# Patient Record
Sex: FEMALE | Race: WHITE | Employment: FULL TIME | URBAN - METROPOLITAN AREA
[De-identification: names, ages, dates, MRNs, and addresses within clinical notes are randomized per-mention and may not be internally consistent; named-entity substitution may affect disease eponyms.]

---

## 2022-07-05 ENCOUNTER — APPOINTMENT (OUTPATIENT)
Dept: CT IMAGING | Age: 62
End: 2022-07-05
Attending: STUDENT IN AN ORGANIZED HEALTH CARE EDUCATION/TRAINING PROGRAM
Payer: COMMERCIAL

## 2022-07-05 ENCOUNTER — HOSPITAL ENCOUNTER (OUTPATIENT)
Age: 62
Setting detail: OBSERVATION
Discharge: HOME | End: 2022-07-06
Attending: STUDENT IN AN ORGANIZED HEALTH CARE EDUCATION/TRAINING PROGRAM | Admitting: INTERNAL MEDICINE
Payer: COMMERCIAL

## 2022-07-05 ENCOUNTER — APPOINTMENT (OUTPATIENT)
Dept: MRI IMAGING | Age: 62
End: 2022-07-05
Attending: INTERNAL MEDICINE
Payer: COMMERCIAL

## 2022-07-05 DIAGNOSIS — I63.50 POSTERIOR CIRCULATION STROKE (HCC): ICD-10-CM

## 2022-07-05 DIAGNOSIS — G45.9 TIA (TRANSIENT ISCHEMIC ATTACK): Primary | ICD-10-CM

## 2022-07-05 LAB
ALBUMIN SERPL-MCNC: 3.6 G/DL (ref 3.5–5)
ALBUMIN/GLOB SERPL: 1.1 {RATIO} (ref 1.1–2.2)
ALP SERPL-CCNC: 95 U/L (ref 45–117)
ALT SERPL-CCNC: 27 U/L (ref 12–78)
ANION GAP SERPL CALC-SCNC: 6 MMOL/L (ref 5–15)
AST SERPL-CCNC: 14 U/L (ref 15–37)
BASOPHILS # BLD: 0 K/UL (ref 0–0.1)
BASOPHILS NFR BLD: 1 % (ref 0–1)
BILIRUB SERPL-MCNC: 0.4 MG/DL (ref 0.2–1)
BUN SERPL-MCNC: 10 MG/DL (ref 6–20)
BUN/CREAT SERPL: 14 (ref 12–20)
CALCIUM SERPL-MCNC: 9.1 MG/DL (ref 8.5–10.1)
CHLORIDE SERPL-SCNC: 108 MMOL/L (ref 97–108)
CHOLEST SERPL-MCNC: 133 MG/DL
CO2 SERPL-SCNC: 28 MMOL/L (ref 21–32)
CREAT SERPL-MCNC: 0.71 MG/DL (ref 0.55–1.02)
DIFFERENTIAL METHOD BLD: NORMAL
EOSINOPHIL # BLD: 0.2 K/UL (ref 0–0.4)
EOSINOPHIL NFR BLD: 3 % (ref 0–7)
ERYTHROCYTE [DISTWIDTH] IN BLOOD BY AUTOMATED COUNT: 13.7 % (ref 11.5–14.5)
GLOBULIN SER CALC-MCNC: 3.4 G/DL (ref 2–4)
GLUCOSE BLD STRIP.AUTO-MCNC: 97 MG/DL (ref 65–117)
GLUCOSE SERPL-MCNC: 102 MG/DL (ref 65–100)
HCT VFR BLD AUTO: 39.6 % (ref 35–47)
HDLC SERPL-MCNC: 63 MG/DL
HDLC SERPL: 2.1 {RATIO} (ref 0–5)
HGB BLD-MCNC: 12.5 G/DL (ref 11.5–16)
IMM GRANULOCYTES # BLD AUTO: 0 K/UL (ref 0–0.04)
IMM GRANULOCYTES NFR BLD AUTO: 0 % (ref 0–0.5)
INR PPP: 0.9 (ref 0.9–1.1)
LDLC SERPL CALC-MCNC: 47.8 MG/DL (ref 0–100)
LYMPHOCYTES # BLD: 2 K/UL (ref 0.8–3.5)
LYMPHOCYTES NFR BLD: 27 % (ref 12–49)
MCH RBC QN AUTO: 28.4 PG (ref 26–34)
MCHC RBC AUTO-ENTMCNC: 31.6 G/DL (ref 30–36.5)
MCV RBC AUTO: 90 FL (ref 80–99)
MONOCYTES # BLD: 0.6 K/UL (ref 0–1)
MONOCYTES NFR BLD: 8 % (ref 5–13)
NEUTS SEG # BLD: 4.5 K/UL (ref 1.8–8)
NEUTS SEG NFR BLD: 61 % (ref 32–75)
NRBC # BLD: 0 K/UL (ref 0–0.01)
NRBC BLD-RTO: 0 PER 100 WBC
PLATELET # BLD AUTO: 280 K/UL (ref 150–400)
PMV BLD AUTO: 9.7 FL (ref 8.9–12.9)
POTASSIUM SERPL-SCNC: 3.7 MMOL/L (ref 3.5–5.1)
PROT SERPL-MCNC: 7 G/DL (ref 6.4–8.2)
PROTHROMBIN TIME: 9.8 SEC (ref 9–11.1)
RBC # BLD AUTO: 4.4 M/UL (ref 3.8–5.2)
SERVICE CMNT-IMP: NORMAL
SODIUM SERPL-SCNC: 142 MMOL/L (ref 136–145)
TRIGL SERPL-MCNC: 111 MG/DL (ref ?–150)
TSH SERPL DL<=0.05 MIU/L-ACNC: 1.4 UIU/ML (ref 0.36–3.74)
VLDLC SERPL CALC-MCNC: 22.2 MG/DL
WBC # BLD AUTO: 7.4 K/UL (ref 3.6–11)

## 2022-07-05 PROCEDURE — 94762 N-INVAS EAR/PLS OXIMTRY CONT: CPT

## 2022-07-05 PROCEDURE — 80053 COMPREHEN METABOLIC PANEL: CPT

## 2022-07-05 PROCEDURE — 85025 COMPLETE CBC W/AUTO DIFF WBC: CPT

## 2022-07-05 PROCEDURE — 74011250637 HC RX REV CODE- 250/637: Performed by: FAMILY MEDICINE

## 2022-07-05 PROCEDURE — 84443 ASSAY THYROID STIM HORMONE: CPT

## 2022-07-05 PROCEDURE — 93005 ELECTROCARDIOGRAM TRACING: CPT

## 2022-07-05 PROCEDURE — G0378 HOSPITAL OBSERVATION PER HR: HCPCS

## 2022-07-05 PROCEDURE — 80061 LIPID PANEL: CPT

## 2022-07-05 PROCEDURE — 36415 COLL VENOUS BLD VENIPUNCTURE: CPT

## 2022-07-05 PROCEDURE — 85610 PROTHROMBIN TIME: CPT

## 2022-07-05 PROCEDURE — 97530 THERAPEUTIC ACTIVITIES: CPT

## 2022-07-05 PROCEDURE — 74011000636 HC RX REV CODE- 636: Performed by: STUDENT IN AN ORGANIZED HEALTH CARE EDUCATION/TRAINING PROGRAM

## 2022-07-05 PROCEDURE — 74011250636 HC RX REV CODE- 250/636: Performed by: STUDENT IN AN ORGANIZED HEALTH CARE EDUCATION/TRAINING PROGRAM

## 2022-07-05 PROCEDURE — 82962 GLUCOSE BLOOD TEST: CPT

## 2022-07-05 PROCEDURE — 95816 EEG AWAKE AND DROWSY: CPT | Performed by: PSYCHIATRY & NEUROLOGY

## 2022-07-05 PROCEDURE — 70551 MRI BRAIN STEM W/O DYE: CPT

## 2022-07-05 PROCEDURE — 70496 CT ANGIOGRAPHY HEAD: CPT

## 2022-07-05 PROCEDURE — 65270000046 HC RM TELEMETRY

## 2022-07-05 PROCEDURE — 97165 OT EVAL LOW COMPLEX 30 MIN: CPT

## 2022-07-05 PROCEDURE — 74011250637 HC RX REV CODE- 250/637: Performed by: STUDENT IN AN ORGANIZED HEALTH CARE EDUCATION/TRAINING PROGRAM

## 2022-07-05 PROCEDURE — 99285 EMERGENCY DEPT VISIT HI MDM: CPT

## 2022-07-05 PROCEDURE — 92610 EVALUATE SWALLOWING FUNCTION: CPT

## 2022-07-05 PROCEDURE — 83036 HEMOGLOBIN GLYCOSYLATED A1C: CPT

## 2022-07-05 PROCEDURE — 70450 CT HEAD/BRAIN W/O DYE: CPT

## 2022-07-05 RX ORDER — GUAIFENESIN 100 MG/5ML
81 LIQUID (ML) ORAL DAILY
Status: DISCONTINUED | OUTPATIENT
Start: 2022-07-05 | End: 2022-07-06 | Stop reason: HOSPADM

## 2022-07-05 RX ORDER — ACETAMINOPHEN 650 MG/1
650 SUPPOSITORY RECTAL
Status: DISCONTINUED | OUTPATIENT
Start: 2022-07-05 | End: 2022-07-06 | Stop reason: HOSPADM

## 2022-07-05 RX ORDER — BUTALBITAL, ACETAMINOPHEN AND CAFFEINE 50; 325; 40 MG/1; MG/1; MG/1
1 TABLET ORAL ONCE
Status: COMPLETED | OUTPATIENT
Start: 2022-07-05 | End: 2022-07-05

## 2022-07-05 RX ORDER — GUAIFENESIN 100 MG/5ML
81 LIQUID (ML) ORAL DAILY
Status: DISCONTINUED | OUTPATIENT
Start: 2022-07-05 | End: 2022-07-05

## 2022-07-05 RX ORDER — MECLIZINE HYDROCHLORIDE 25 MG/1
25 TABLET ORAL
Status: COMPLETED | OUTPATIENT
Start: 2022-07-05 | End: 2022-07-05

## 2022-07-05 RX ORDER — CLOPIDOGREL BISULFATE 75 MG/1
75 TABLET ORAL DAILY
Status: DISCONTINUED | OUTPATIENT
Start: 2022-07-05 | End: 2022-07-05

## 2022-07-05 RX ORDER — ACETAMINOPHEN 325 MG/1
650 TABLET ORAL
Status: DISCONTINUED | OUTPATIENT
Start: 2022-07-05 | End: 2022-07-06 | Stop reason: HOSPADM

## 2022-07-05 RX ORDER — CLOPIDOGREL BISULFATE 75 MG/1
75 TABLET ORAL DAILY
Status: DISCONTINUED | OUTPATIENT
Start: 2022-07-06 | End: 2022-07-06

## 2022-07-05 RX ORDER — CLOPIDOGREL BISULFATE 75 MG/1
300 TABLET ORAL
Status: COMPLETED | OUTPATIENT
Start: 2022-07-05 | End: 2022-07-05

## 2022-07-05 RX ADMIN — CLOPIDOGREL BISULFATE 300 MG: 75 TABLET ORAL at 10:20

## 2022-07-05 RX ADMIN — MECLIZINE HYDROCHLORIDE 25 MG: 25 TABLET ORAL at 10:21

## 2022-07-05 RX ADMIN — IOPAMIDOL 100 ML: 755 INJECTION, SOLUTION INTRAVENOUS at 09:32

## 2022-07-05 RX ADMIN — ASPIRIN 81 MG: 81 TABLET, CHEWABLE ORAL at 10:21

## 2022-07-05 RX ADMIN — BUTALBITAL, ACETAMINOPHEN, AND CAFFEINE 1 TABLET: 50; 325; 40 TABLET ORAL at 15:37

## 2022-07-05 NOTE — PROCEDURES
[facility] 79  EEG    Name:  Sherry Casarez  MR#:  [id number]  :  1960  ACCOUNT #:  [de-identified]  DATE OF SERVICE:  2022      REFERRING PROVIDER:  German Weller MD    CLINICAL HISTORY:  An EEG is requested on this 51-year-old lady to evaluate for epileptiform abnormality. MEDICATIONS:  Vitamins. EEG REPORT:  This tracing is obtained during the awake state. During wakefulness, there are intermittent runs of posteriorly dominant and symmetrical low-to-medium amplitude 11 cycle per second activities which attenuate with eye opening. Lower voltage faster frequency activities are seen symmetrically over the anterior head regions. Hyperventilation is not performed. Intermittent photic stimulation induces symmetric posterior driving responses. Sleep is not attained. IMPRESSION/INTERPRETATION:  This EEG recorded during the awake state is normal.  No epileptiform abnormalities are seen.       Karen Larios MD      [id number]  D:  2022 16:30  T:  2022 17:58  JOB #:  [id number]

## 2022-07-05 NOTE — PROGRESS NOTES
SPEECH PATHOLOGY BEDSIDE SWALLOW EVALUATION/DISCHARGE  Patient: Debora Caruso (79 y.o. female)  Date: 7/5/2022  Primary Diagnosis: TIA (transient ischemic attack) [G45.9]       Precautions: aspiration       ASSESSMENT :  Based on the objective data described below, the patient presents with functional swallow and speech. Still c/o upper L facial numbness and L eye opening slightly slower after blink than R eye. Admitted 7-5-22 with vertigo, nausea, L facial numbness, L UE weakness. MRI: negative. PMH: CAD, trauma. Chart lists CVA,but she denies. .  Skilled acute therapy provided by a speech-language pathologist is not indicated at this time. PLAN :  Recommendations:  Ok for regular diet, thin liquids. Discharge Recommendations: None     SUBJECTIVE:   Patient stated I woke up and I realized I was walking like a drunk . OBJECTIVE:     Past Medical History:   Diagnosis Date    Occipital headache 7/25/2016     Past Surgical History:   Procedure Laterality Date    HX CHOLECYSTECTOMY      HX HYSTERECTOMY  2017     Prior Level of Function/Home Situation:      Diet prior to admission: regular, thins  Current Diet:  Regular, thins. She passed  A Monique SS   Cognitive and Communication Status:  Neurologic State: Alert  Orientation Level: Oriented X4  Cognition: Follows commands  Perception: Appears intact     Safety/Judgement: Lack of insight into deficits  Oral Assessment:  Oral Assessment  Labial:  (c/o L facial numbness \"more around the eye\". SLP noted slower eye opening after blink on L than R)  Dentition: Natural  Lingual: No impairment  Velum: No impairment  Mandible: No impairment  P.O. Trials:  Patient Position: upright in bed  Vocal quality prior to P.O.: No impairment  Consistency Presented: Solid; Thin liquid  How Presented: Self-fed/presented;Straw;Successive swallows   ORAL PHASE:   Bolus Acceptance: No impairment  Bolus Formation/Control: No impairment     Propulsion: No impairment  Oral Residue: None   PHARYNGEAL PHASE:   Initiation of Swallow: No impairment  Laryngeal Elevation: Functional  Aspiration Signs/Symptoms: None  Pharyngeal Phase Characteristics: No impairment, issues, or problems       SPEECH;   WNL             NOMS:   The NOMS functional outcome measure was used to quantify this patient's level of swallowing impairment. Based on the NOMS, the patient was determined to be at level 7 for swallow function     NOMS Swallowing Levels:  Level 1 (CN): NPO  Level 2 (CM): NPO but takes consistency in therapy  Level 3 (CL): Takes less than 50% of nutrition p.o. and continues with nonoral feedings; and/or safe with mod cues; and/or max diet restriction  Level 4 (CK): Safe swallow but needs mod cues; and/or mod diet restriction; and/or still requires some nonoral feeding/supplements  Level 5 (CJ): Safe swallow with min diet restriction; and/or needs min cues  Level 6 (CI): Independent with p.o.; rare cues; usually self cues; may need to avoid some foods or needs extra time  Level 7 ([address] [place] [address]): Independent for all p.o.  SHARON. (2003). National Outcomes Measurement System (NOMS): Adult Speech-Language Pathology User's Guide. Pain:  Pain Scale 1: Numeric (0 - 10)  Pain Intensity 1: 0     After treatment:   Patient left in no apparent distress in bed, Call bell within reach and Nursing notified    COMMUNICATION/EDUCATION:   Patient was educated regarding her deficit(s) of NONE as this relates to her diagnosis of TIA. She demonstrated Excellent understanding as evidenced by Discussion. .    The patient's plan of care including recommendations, planned interventions, and recommended diet changes were discussed with: Registered nurse.  OT    Thank you for this referral.  YELENA Allen  Time Calculation: 10 mins

## 2022-07-05 NOTE — H&P
[facility]  [address] [place] [address], [id number] [facility]  [phone]    [address] Adult  Hospitalist Group    History & Physical    Date of service: 7/5/2022    Patient name: Tran Way  MRN: [id number]  YOB: 1960  Age: 58 y.o. Primary care provider:  Bradley Phillips MD     Source of Information: patient, medical record    Chief complain: left face and arm numbness    History of present illness  Tran Way is a 58 y.o. female who presented with headache and left and arm numbness that she noticed upon waking this morning. She reports some nausea, blurry vision and unsteadiness with ambulation as well. She also reports feeling dizzy, but she as able to  herself to the ED, and while driving she noticed that her left arm was feeling a little weaker than usual. Symptoms have now mostly resolved, but she still reports having headache. She has been told her blood pressure is intermittently elevated, but is not taking any medications at home. Past Medical History:   Diagnosis Date    Occipital headache 7/25/2016      Past Surgical History:   Procedure Laterality Date    HX CHOLECYSTECTOMY      HX HYSTERECTOMY  2017     Prior to Admission medications    Multivitamin  Vitamin D     No Known Allergies     Family history  -heart disease  -no h/o stroke  -HTN     Social history    Social History     Tobacco Use   Smoking Status Former Smoker   Smokeless Tobacco Never Used       Social History     Substance and Sexual Activity   Alcohol Use Yes    Comment: occasional glass of wine       Code status  Code status discussed with the patient/caregivers. Full Code    Review of systems    A comprehensive review of systems was negative except for that written in the History of Present Illness.        Physical Examination   Visit Vitals  BP (!) 140/88 (BP 1 Location: Left lower arm, BP Patient Position: At rest)   Pulse 64   Temp 97.9 °F (36.6 °C)   Resp 16 Ht 5' 5.5\" (1.664 m)   Wt 115.2 kg (253 lb 15.5 oz)   LMP 03/17/2014   SpO2 99%   BMI 41.62 kg/m²          O2 Device: None (Room air)    Gen:  awake, alert, NAD   HEENT:  Pink conjunctivae, PERRL, hearing intact to voice, moist mucous membranes  Neck:  Supple, without masses, thyroid non-tender  Resp:  No accessory muscle use, clear breath sounds without wheezes rales or rhonchi  Card:  No murmurs, normal S1, S2 without thrills, bruits or peripheral edema  Abd:  Soft, non-tender, non-distended, normoactive bowel sounds are present  Lymph:  No cervical adenopathy  Musc:  No cyanosis or clubbing  Skin:  No rashes   Neuro:  Cranial nerves 3-12 are grossly intact, follows commands appropriately  Psych:  Alert with good insight. Oriented to person, place, and time    Data Review    24 Hour Results:  Recent Results (from the past 24 hour(s))   GLUCOSE, POC    Collection Time: 07/05/22  9:16 AM   Result Value Ref Range    Glucose (POC) 97 65 - 117 mg/dL    Performed by Roosevelt Vaughn    CBC WITH AUTOMATED DIFF    Collection Time: 07/05/22  9:28 AM   Result Value Ref Range    WBC 7.4 3.6 - 11.0 K/uL    RBC 4.40 3.80 - 5.20 M/uL    HGB 12.5 11.5 - 16.0 g/dL    HCT 39.6 35.0 - 47.0 %    MCV 90.0 80.0 - 99.0 FL    MCH 28.4 26.0 - 34.0 PG    MCHC 31.6 30.0 - 36.5 g/dL    RDW 13.7 11.5 - 14.5 %    PLATELET 636 800 - 125 K/uL    MPV 9.7 8.9 - 12.9 FL    NRBC 0.0 0.0  WBC    ABSOLUTE NRBC 0.00 0.00 - 0.01 K/uL    NEUTROPHILS 61 32 - 75 %    LYMPHOCYTES 27 12 - 49 %    MONOCYTES 8 5 - 13 %    EOSINOPHILS 3 0 - 7 %    BASOPHILS 1 0 - 1 %    IMMATURE GRANULOCYTES 0 0 - 0.5 %    ABS. NEUTROPHILS 4.5 1.8 - 8.0 K/UL    ABS. LYMPHOCYTES 2.0 0.8 - 3.5 K/UL    ABS. MONOCYTES 0.6 0.0 - 1.0 K/UL    ABS. EOSINOPHILS 0.2 0.0 - 0.4 K/UL    ABS. BASOPHILS 0.0 0.0 - 0.1 K/UL    ABS. IMM.  GRANS. 0.0 0.00 - 0.04 K/UL    DF AUTOMATED     METABOLIC PANEL, COMPREHENSIVE    Collection Time: 07/05/22  9:28 AM   Result Value Ref Range Sodium 142 136 - 145 mmol/L    Potassium 3.7 3.5 - 5.1 mmol/L    Chloride 108 97 - 108 mmol/L    CO2 28 21 - 32 mmol/L    Anion gap 6 5 - 15 mmol/L    Glucose 102 (H) 65 - 100 mg/dL    BUN 10 6 - 20 MG/DL    Creatinine 0.71 0.55 - 1.02 MG/DL    BUN/Creatinine ratio 14 12 - 20      GFR est AA >60 >60 ml/min/1.73m2    GFR est non-AA >60 >60 ml/min/1.73m2    Calcium 9.1 8.5 - 10.1 MG/DL    Bilirubin, total 0.4 0.2 - 1.0 MG/DL    ALT (SGPT) 27 12 - 78 U/L    AST (SGOT) 14 (L) 15 - 37 U/L    Alk. phosphatase 95 45 - 117 U/L    Protein, total 7.0 6.4 - 8.2 g/dL    Albumin 3.6 3.5 - 5.0 g/dL    Globulin 3.4 2.0 - 4.0 g/dL    A-G Ratio 1.1 1.1 - 2.2     PROTHROMBIN TIME + INR    Collection Time: 07/05/22  9:28 AM   Result Value Ref Range    INR 0.9 0.9 - 1.1      Prothrombin time 9.8 9.0 - 11.1 sec   TSH 3RD GENERATION    Collection Time: 07/05/22  9:28 AM   Result Value Ref Range    TSH 1.40 0.36 - 3.74 uIU/mL   EKG, 12 LEAD, INITIAL    Collection Time: 07/05/22  9:41 AM   Result Value Ref Range    Ventricular Rate 69 BPM    Atrial Rate 69 BPM    P-R Interval 146 ms    QRS Duration 90 ms    Q-T Interval 400 ms    QTC Calculation (Bezet) 428 ms    Calculated P Axis 36 degrees    Calculated R Axis 52 degrees    Calculated T Axis 36 degrees    Diagnosis       Normal sinus rhythm  Normal ECG  When compared with ECG of 25-JUL-2016 01:16,  No significant change was found       Recent Labs     07/05/22 0928   WBC 7.4   HGB 12.5   HCT 39.6        Recent Labs     07/05/22 0928      K 3.7      CO2 28   *   BUN 10   CREA 0.71   CA 9.1   ALB 3.6   TBILI 0.4   ALT 27   INR 0.9       Imaging  MRI BRAIN WO CONT    Result Date: 7/5/2022  EXAM: MRI BRAIN WO CONT INDICATION: TIA dizziness COMPARISON: July 2016 CONTRAST: None. TECHNIQUE:  Multiplanar multisequence acquisition without contrast of the brain. FINDINGS: Diffusion imaging does not show acute ischemic changes.  There is no extra-axial fluid collection hemorrhage or shift. There is no significant white matter disease with normal size ventricles. Flow voids in major vessels at the base of the brain are present. No mass. Negative examination. CTA CODE NEURO HEAD AND NECK W CONT    Result Date: 7/5/2022  EXAM:  CTA CODE NEURO HEAD AND NECK W CONT INDICATION:   Code Stroke COMPARISON:  CT head 7/5/2022, MRA head 7/25/2016. CONTRAST:  100 mL of Isovue-370. TECHNIQUE:  Unenhanced  images were obtained to localize the volume for acquisition. Multislice helical axial CT angiography was performed from the aortic arch to the top of the head during uneventful rapid bolus intravenous contrast administration. Coronal and sagittal reformations and 3D post processing was performed. CT dose reduction was achieved through use of a standardized protocol tailored for this examination and automatic exposure control for dose modulation. This study was analyzed by the [address]  algorithm. FINDINGS: CTA Head: There is no evidence of large vessel occlusion or flow-limiting stenosis of the intracranial internal carotid, anterior cerebral, and middle cerebral arteries. The anterior communicating artery is patent. There is no evidence of large vessel occlusion or flow-limiting stenosis of the intracranial vertebral arteries, basilar artery, or posterior cerebral arteries. The posterior communicating arteries are patent. There is no evidence of aneurysm or vascular malformation. The dural venous sinuses and deep cerebral venous system are patent. No evidence of abnormal enhancement on delayed phase images. CTA NECK: NASCET method was utilized for calculating stenosis. The aortic arch is unremarkable. The common carotid arteries demonstrate no significant stenosis. There is no evidence of significant stenosis in the cervical right internal carotid artery. There is no evidence of significant stenosis in the cervical left internal carotid artery.  There is a right dominant vertebrobasilar arterial system. The left vertebral artery arises directly from the aortic arch. The cervical vertebral arteries are normal in course, size and contour without significant stenosis. There is a 2.3 cm thyroid isthmus nodule (series 3 image 42). Visualized lung apices are clear. No acute fracture or aggressive osseous lesion. Reversal of the cervical lordosis with multilevel degenerative disc disease most advanced at C5-C6. CTA Head: 1. No evidence of significant stenosis or aneurysm. CTA Neck: 1. No evidence of significant stenosis. CT CODE NEURO HEAD WO CONTRAST    Result Date: 7/5/2022  EXAM: CT CODE NEURO HEAD WO CONTRAST INDICATION: Code Stroke COMPARISON: MRI brain 7/25/2016, CT head 7/25/2016. TECHNIQUE: Unenhanced CT of the head was performed using 5 mm images. Brain and bone windows were generated. CT dose reduction was achieved through use of a standardized protocol tailored for this examination and automatic exposure control for dose modulation. FINDINGS: Generalized volume loss. There is no significant white matter disease. There is no intracranial hemorrhage, extra-axial collection, mass, mass effect or midline shift. The basilar cisterns are open. No acute infarct is identified. The bone windows demonstrate no abnormalities. The visualized portions of the paranasal sinuses and mastoid air cells are clear. No acute abnormality        Assessment and Plan     TIA  -presented with left facial and LUE deficits  -MRI neg for acute stroke and CTA neck neg for significant stenosis  -check lipds and A1C  -PT/OT consults  -neuro consults    Elevated blood pressure  -not on any home meds  -Cont to monitor, may start low dose anti hypertensive tomorrow if it remains elevated. -for now allow permissive HTN    Vitamin D deficiency  -on supplements    Diet: regular  Activity: as tolerated  DVT prophylaxis: lovenox  Isolation precautions: none       Signed by:  Dorys Ferrari MD    July 5, 2022 at 1:21 PM

## 2022-07-05 NOTE — ED TRIAGE NOTES
Pt reports waking up this AM with numbness on L side of face, L arm weakness, and headache. Rpeorts going to bed 2200 last PM feeling normal.  ? L sided facial droop.

## 2022-07-05 NOTE — ED NOTES
TRANSFER - OUT REPORT:    Verbal report given Steve Mcdowell RN on Marlen [place]  being transferred to [facility] rm [id number] for neuro/tele routine progression of care    Report consisted of patients Situation, Background, Assessment and   Recommendations(SBAR). Information from the following report(s) SBAR was reviewed with the receiving nurse. Lines:   #[id number] RAX    Opportunity for questions and clarification was provided.       Patient transported with:   Monitor via [facility]

## 2022-07-05 NOTE — PROGRESS NOTES
Physical Therapy  7/5/2022    Chart reviewed and orders acknowledged. Pt off the floor but  present at bedside. Per his report and RNs report pt is back to baseline without c/o symptoms. Ambulates with steady gait without device.  aware of [facility] FAST education. MRI negative. No acute therapy needs at this time. Will sign off.      Thank Alexis Bocanegra, PT, DPT

## 2022-07-05 NOTE — ED PROVIDER NOTES
Patient is a 40-year-old female with no history of stroke, heart disease, blood thinners, trauma presenting to the ED with head pressure, vertigo, nausea, left-sided facial numbness, left upper extremity weakness. Patient states this is never happened before. No history of vertigo. The history is provided by the patient and medical records. Numbness  This is a new problem. The current episode started 6 to 12 hours ago. The problem has not changed since onset. There was left facial and left upper extremity focality noted. Primary symptoms include focal weakness, loss of sensation, loss of balance and visual change. There has been no fever. Associated symptoms include headaches and nausea. There were no medications administered prior to arrival. Associated medical issues do not include trauma, seizures, dementia or CVA. Past Medical History:   Diagnosis Date    Occipital headache 7/25/2016       Past Surgical History:   Procedure Laterality Date    HX CHOLECYSTECTOMY      HX HYSTERECTOMY  2017         History reviewed. No pertinent family history.      Social History     Socioeconomic History    Marital status:      Spouse name: Not on file    Number of children: Not on file    Years of education: Not on file    Highest education level: Not on file   Occupational History    Not on file   Tobacco Use    Smoking status: Former Smoker    Smokeless tobacco: Never Used   Substance and Sexual Activity    Alcohol use: Yes     Comment: occasional glass of wine    Drug use: No    Sexual activity: Not on file   Other Topics Concern    Not on file   Social History Narrative    Not on file     Social Determinants of Health     Financial Resource Strain:     Difficulty of Paying Living Expenses: Not on file   Food Insecurity:     Worried About Running Out of Food in the Last Year: Not on file    Esdras of Food in the Last Year: Not on file   Transportation Needs:     Lack of Transportation (Medical): Not on file    Lack of Transportation (Non-Medical): Not on file   Physical Activity:     Days of Exercise per Week: Not on file    Minutes of Exercise per Session: Not on file   Stress:     Feeling of Stress : Not on file   Social Connections:     Frequency of Communication with Friends and Family: Not on file    Frequency of Social Gatherings with Friends and Family: Not on file    Attends Jewish Services: Not on file    Active Member of [address] [place] [address] [employer] or Organizations: Not on file    Attends Club or Organization Meetings: Not on file    Marital Status: Not on file   Intimate Partner Violence:     Fear of Current or Ex-Partner: Not on file    Emotionally Abused: Not on file    Physically Abused: Not on file    Sexually Abused: Not on file   Housing Stability:     Unable to Pay for Housing in the Last Year: Not on file    Number of [place] in the Last Year: Not on file    Unstable Housing in the Last Year: Not on file         ALLERGIES: Patient has no known allergies. Review of Systems   Constitutional: Positive for activity change and fatigue. HENT: Negative. Eyes: Negative. Respiratory: Negative. Cardiovascular: Negative. Gastrointestinal: Positive for nausea. Endocrine: Negative. Genitourinary: Negative. Musculoskeletal: Negative. Skin: Negative. Allergic/Immunologic: Negative. Neurological: Positive for dizziness, focal weakness, weakness, numbness, headaches and loss of balance. Hematological: Negative. Psychiatric/Behavioral: Negative. Vitals:    07/05/22 0914 07/05/22 0940 07/05/22 0944   BP: (!) 168/84 (!) 158/90    Pulse: 71 78    Resp: 16 12    Temp:  98 °F (36.7 °C)    SpO2: 97% 98%    Weight:   115.2 kg (253 lb 15.5 oz)   Height:   5' 5.5\" (1.664 m)            Physical Exam  Vitals and nursing note reviewed. Constitutional:       General: She is not in acute distress. Appearance: Normal appearance.    HENT:      Head: Normocephalic and atraumatic. Right Ear: External ear normal.      Left Ear: External ear normal.      Nose: Nose normal.   Eyes:      Extraocular Movements: Extraocular movements intact. Conjunctiva/sclera: Conjunctivae normal.   Cardiovascular:      Rate and Rhythm: Normal rate. Pulses: Normal pulses. Radial pulses are 2+ on the right side and 2+ on the left side. Heart sounds: Normal heart sounds. Pulmonary:      Effort: Pulmonary effort is normal.      Breath sounds: Normal breath sounds. Chest:      Chest wall: No deformity or tenderness. Abdominal:      General: Abdomen is flat. There is no distension. Tenderness: There is no abdominal tenderness. Musculoskeletal:         General: No deformity or signs of injury. Normal range of motion. Cervical back: Normal range of motion and neck supple. No tenderness. Skin:     General: Skin is warm and dry. Capillary Refill: Capillary refill takes less than 2 seconds. Neurological:      General: No focal deficit present. Mental Status: She is alert and oriented to person, place, and time. GCS: GCS eye subscore is 4. GCS verbal subscore is 5. GCS motor subscore is 6. Cranial Nerves: Facial asymmetry present. Motor: Weakness present. Comments: Patient with mild subtle left-sided facial symptoms including left lower leg possible left mouth asymmetry. Psychiatric:         Attention and Perception: Attention normal.         Mood and Affect: Mood normal.         Behavior: Behavior normal.          [facility]  ED Course as of 07/05/22 1020   Tue Jul 05, 2022   0918 Glucose 97, last known normal at 10pm. Woke up with nausea, dizzyness, left-sided facial numbness, arm weakness. On physical exam some left eyelid lag, subtle asymmetry. Level 2 code stroke called. [AL]   [id number] EKG interpretation:   Rhythm: normal sinus rhythm; and regular . Rate (approx.): 69;  Axis: normal; Intervals: normal ; ST/T wave: normal; EKG documented and interpreted by Livier Lopez. Daphne Sunshine MD, Emergency Medicine.     [AL]      ED Course User Index  [AL] David Marcos MD     LABORATORY RESULTS:  Labs Reviewed   METABOLIC PANEL, COMPREHENSIVE - Abnormal; Notable for the following components:       Result Value    Glucose 102 (*)     AST (SGOT) 14 (*)     All other components within normal limits   CBC WITH AUTOMATED DIFF   PROTHROMBIN TIME + INR   TSH 3RD GENERATION   LIPID PANEL   HEMOGLOBIN A1C WITH EAG   GLUCOSE, POC       IMAGING RESULTS:  CTA CODE NEURO HEAD AND NECK W CONT   Final Result   CTA Head:   1. No evidence of significant stenosis or aneurysm. CTA Neck:   1. No evidence of significant stenosis. CT CODE NEURO HEAD WO CONTRAST   Final Result   No acute abnormality          MEDICATIONS GIVEN:  Medications   aspirin chewable tablet 81 mg (has no administration in time range)   clopidogreL (PLAVIX) tablet 300 mg (has no administration in time range)   meclizine (ANTIVERT) tablet 25 mg (has no administration in time range)   iopamidoL (ISOVUE-370) 76 % injection 100 mL (100 mL IntraVENous Given 7/5/22 0932)       Differential diagnosis: TIA, stroke, head pressure, Bell's palsy, headache, electrolyte abnormality, hypoglycemia, hypertensive emergency    ED physician interpretation of imaging: CT head without acute bleeds  ED physician interpretation of EKG: No STEMI. See my interpretation EKG and ED course above. ED physician interpretation of laboratory results: Screening lab work without critical values requiring emergency medical invention. TSH within normal limits. No electrolyte abnormalities. MDM: Patient is a 70-year-old female presented to the ED with neuro symptoms including vertigo, nausea, left-sided facial numbness and tingling, left eyelid leg, subjective left upper extremity weakness with last known normal at 10 PM necessitating a tier 2 code stroke activation. Standard protocol followed.   No acute bleeds, occlusions. Patient is Trent Kwesi negative, no indication for transfer to [facility] for thrombectomy. Patient is not a tPA candidate. Patient's symptoms are improving while in the ED. Teleneurology evaluated the patient and recommends admission for stroke work-up, Aspirin and Plavix load. Patient comfortable with admission at this time. DISPOSITION: Admitted    Perfect Serve Consult for Admission  9:18 AM    ED Room Number: WER03/03  Patient Name and age:  Denton Calabrese 58 y.o.  female  Working Diagnosis:   1. TIA (transient ischemic attack)    2. Posterior circulation stroke ([facility] [address]        COVID-19 Suspicion:  no  Sepsis present:  no  Reassessment needed: no  Code Status:  Full Code  Readmission: no  Isolation Requirements:  no  Recommended Level of Care:  med/surg  Department: [place] [address] ED - [phone]    Other: Patient with neuro symptoms consistent with TIA and possible posterior circulation stroke. No major deficits. No indication for thrombectomy. Patient out of window for tPA. Teleneurology recommends admission for stroke work-up. Neto Valdivia.  Joe Merchant MD      Procedures

## 2022-07-05 NOTE — PROGRESS NOTES
1258: TRANSFER - IN REPORT:    Verbal report received from KRYSTLE Jones(name) on Darrin Nielsen  being received from [facility] ED(unit) for routine progression of care      Report consisted of patients Situation, Background, Assessment and   Recommendations(SBAR). Information from the following report(s) SBAR, Kardex, ED Summary, Procedure Summary, Intake/Output, MAR, Recent Results, Med Rec Status and Cardiac Rhythm NSR was reviewed with the receiving nurse. Opportunity for questions and clarification was provided. Assessment completed upon patients arrival to unit and care assumed. Stroke Education provided to patient and the following topics were discussed    1. Patients personal risk factors for stroke are hypertension and hyperlipidemia    2. Warning signs of Stroke:        * Sudden numbness or weakness of the face, arm or leg, especially on one side of          The body            * Sudden confusion, trouble speaking or understanding        * Sudden trouble seeing in one or both eyes        * Sudden trouble walking, dizziness, loss of balance or coordination        * Sudden severe headache with no known cause      3. Importance of activation Emergency Medical Services ( 9-1-1 ) immediately if experience any warning signs of stroke. 4. Be sure and schedule a follow-up appointment with your primary care doctor or any specialists as instructed. 5. You must take medicine every day to treat your risk factors for stroke. Be sure to take your medicines exactly as your doctor tells you: no more, no less. Know what your medicines are for , what they do. Anti-thrombotics /anticoagulants can help prevent strokes. You are taking the following medicine(s)  ASA, plavix     6. Smoking and second-hand smoke greatly increase your risk of stroke, cardiovascular disease and death. Smoking history ended year 2021    7.  Information provided was [facility] Stroke Education Binder, Stroke Handouts or Verbal Education    8. Documentation of teaching completed in Patient Education Activity and on Care Plan with teaching response noted?   yes

## 2022-07-05 NOTE — PROGRESS NOTES
OCCUPATIONAL THERAPY EVALUATION/DISCHARGE  Patient: Kimberly Mann (42 y.o. female)  Date: 7/5/2022  Primary Diagnosis: TIA (transient ischemic attack) [G45.9]       Precautions: fall       ASSESSMENT  Based on the objective data described below, the patient presents with good overall activity tolerance following admission on 7/5/22 for L side weakness and headache. Patient is undergoing neurological work-up since admission; CT and MRI both negative for acute process. Patient is currently performing transfers and ADLs at her baseline level of independence. Education provided regarding the signs and symptoms of stroke (BEFAST) and confirms understanding. Patient has no further skilled OT needs and cleared from OT services at this time. Current Level of Function (ADLs/self-care): Patient was independent with ADLs and functional mobility. Functional Outcome Measure: The patient scored 66/66 on the Fugl-Moncada Assessment of Upper Extremity outcome measure. PLAN :    Recommendation for discharge: (in order for the patient to meet his/her long term goals)  No skilled occupational therapy/ follow up rehabilitation needs identified at this time. This discharge recommendation:  Has been made in collaboration with the attending provider and/or case management    IF patient discharges home will need the following DME: none       SUBJECTIVE:   Patient agreeable to OT evaluation. OBJECTIVE DATA SUMMARY:   HISTORY:   Past Medical History:   Diagnosis Date    Occipital headache 7/25/2016     Past Surgical History:   Procedure Laterality Date    HX CHOLECYSTECTOMY      HX HYSTERECTOMY  2017       Prior Level of Function/Environment/Context: Patient lives with her .     Expanded or extensive additional review of patient history:        Hand dominance: Right    EXAMINATION OF PERFORMANCE DEFICITS:  Cognitive/Behavioral Status:  Neurologic State: Alert  Orientation Level: Oriented X4  Cognition: Follows commands  Perception: Appears intact  Perseveration: No perseveration noted  Safety/Judgement: Awareness of environment    Skin: intact in the uppers    Edema: None noted in the uppers    Hearing: Auditory  Auditory Impairment: None    Vision/Perceptual:    Tracking: Able to track stimulus in all quadrants w/o difficulty    Diplopia: No    Acuity: Within Defined Limits       Range of Motion:  WDL In the uppers    Strength:  WDL in the uppers      Coordination:  Fine Motor Skills-Upper: Left Intact; Right Intact    Gross Motor Skills-Upper: Left Intact; Right Intact    Tone & Sensation:  Tone: normal  Sensation: intact      Balance:  Sitting: Intact  Standing: Intact    Functional Mobility and Transfers for ADLs:  Bed Mobility:  Rolling: Independent  Supine to Sit: Independent  Sit to Supine: Independent  Scooting: Independent    Transfers:  Sit to Stand: Independent  Stand to Sit: Independent  Bed to Chair: Independent    ADL Assessment:  Feeding: Independent    Oral Facial Hygiene/Grooming: Independent    Bathing: Independent    Upper Body Dressing: Independent    Lower Body Dressing: Independent    Toileting: Independent    Cognitive Retraining  Safety/Judgement: Awareness of environment    Functional Measure:  Fugl-Moncada Assessment of Motor Recovery after Stroke:          Reflex Activity  Flexors/Biceps/Fingers: Can be elicited  Extensors/Triceps: Can be elicited  Reflex Subtotal: 4    Volitional Movement Within Synergies  Shoulder Retraction: Full  Shoulder Elevation: Full  Shoulder Abduction (90 degrees): Full  Shoulder External Rotation: Full  Elbow Flexion: Full  Forearm Supination: Full  Shoulder Adduction/Internal Rotation: Full  Elbow Extension: Full  Forearm Pronation: Full  Subtotal: 18    Volitional Movement Mixing Synergies  Hand to Lumbar Spine: Full  Shoulder Flexion (0-90 degrees): Full  Pronation-Supination: Full  Subtotal: 6    Volitional Movement With Little or No Synergy  Shoulder Abduction (0-90 degrees): Full  Shoulder Flexion ( degrees): Full  Pronation/Supination: Full  Subtotal : 6    Normal Reflex Activity  Biceps, Triceps, Finger Flexors: Full  Subtotal : 2    Upper Extremity Total   Upper Extremity Total: 36    Wrist  Stability at 15 Degree Dorsiflexion: Full  Repeated Dorsiflexion/ Volar Flexion: Full  Stability at 15 Degree Dorsiflexion: Full  Repeated Dorsiflexion/ Volar Flexion: Full  Circumduction: Full  Wrist Total: 10    Hand  Mass Flexion: Full  Mass Extension: Full  Grasp A: Full  Grasp B: Full  Grasp C: Full  Grasp D: Full  Grasp E: Full  Hand Total: 14    Coordination/Speed  Tremor: None  Dysmetria: None  Time: <1s  Coordination/Speed Total : 6    Total A-D  Total A-D (Motor Function): 66/66         This is a reliable/valid measure of arm function after a neurological event. It has established value to characterize functional status and for measuring spontaneous and therapy-induced recovery; tests proximal and distal motor functions. Fugl-Moncada Assessment - UE scores recorded between five and 30 days post neurologic event can be used to predict UE recovery at six months post neurologic event. Severe = 0-21 points   Moderately Severe = 22-33 points   Moderate = 34-47 points   Mild = 48-66 points  MELITON Cordero, MARK Mcintosh, & KONSTANTIN John (1992). Measurement of motor recovery after stroke: Outcome assessment and sample size requirements.  Stroke, 23, pp. 4255-2706.   --------------------------------------------------------------------------------------------------------------------------------------------------------------------  MCID:  Stroke:   Dian Mcardle et al, 2001; n = 171; mean age 79 (6) years; assessed within 16 (12) days of stroke, Acute Stroke)  FMA Motor Scores from Admission to Discharge    10 point increase in FMA Upper Extremity = 1.5 change in discharge FIM    10 point increase in FMA Lower Extremity = 1.9 change in discharge FIM  MDC:   Stroke: Karl Franco et al, 2008, n = 14, mean age = 59.9 (14.6) years, assessed on average 14 (6.5) months post stroke, Chronic Stroke)    FMA = 5.2 points for the Upper Extremity portion of the assessment       Occupational Therapy Evaluation Charge Determination   History Examination Decision-Making   LOW Complexity : Brief history review  LOW Complexity : 1-3 performance deficits relating to physical, cognitive , or psychosocial skils that result in activity limitations and / or participation restrictions  LOW Complexity : No comorbidities that affect functional and no verbal or physical assistance needed to complete eval tasks       Based on the above components, the patient evaluation is determined to be of the following complexity level: LOW     Activity Tolerance:   Good    After treatment patient left in no apparent distress:    Supine in bed, Call bell within reach and Bed / chair alarm activated    COMMUNICATION/EDUCATION:   The patients plan of care was discussed with: Physical therapist, Registered nurse and patient. .     Thank you for this referral.  Janet Montalvo OTR/L  Time Calculation: 25 mins

## 2022-07-06 ENCOUNTER — APPOINTMENT (OUTPATIENT)
Dept: NON INVASIVE DIAGNOSTICS | Age: 62
End: 2022-07-06
Attending: FAMILY MEDICINE
Payer: COMMERCIAL

## 2022-07-06 VITALS
RESPIRATION RATE: 17 BRPM | DIASTOLIC BLOOD PRESSURE: 83 MMHG | BODY MASS INDEX: 40.82 KG/M2 | WEIGHT: 253.97 LBS | HEIGHT: 66 IN | SYSTOLIC BLOOD PRESSURE: 136 MMHG | OXYGEN SATURATION: 97 % | HEART RATE: 67 BPM | TEMPERATURE: 97.8 F

## 2022-07-06 LAB
ATRIAL RATE: 69 BPM
CALCULATED P AXIS, ECG09: 36 DEGREES
CALCULATED R AXIS, ECG10: 52 DEGREES
CALCULATED T AXIS, ECG11: 36 DEGREES
CHOLEST SERPL-MCNC: 123 MG/DL
COMMENT, HOLDF: NORMAL
DIAGNOSIS, 93000: NORMAL
EST. AVERAGE GLUCOSE BLD GHB EST-MCNC: 105 MG/DL
HBA1C MFR BLD: 5.3 % (ref 4–5.6)
HDLC SERPL-MCNC: 56 MG/DL
HDLC SERPL: 2.2 {RATIO} (ref 0–5)
LDLC SERPL CALC-MCNC: 49.8 MG/DL (ref 0–100)
P-R INTERVAL, ECG05: 146 MS
Q-T INTERVAL, ECG07: 400 MS
QRS DURATION, ECG06: 90 MS
QTC CALCULATION (BEZET), ECG08: 428 MS
SAMPLES BEING HELD,HOLD: NORMAL
TRIGL SERPL-MCNC: 86 MG/DL (ref ?–150)
VENTRICULAR RATE, ECG03: 69 BPM
VLDLC SERPL CALC-MCNC: 17.2 MG/DL

## 2022-07-06 PROCEDURE — 36415 COLL VENOUS BLD VENIPUNCTURE: CPT

## 2022-07-06 PROCEDURE — G0378 HOSPITAL OBSERVATION PER HR: HCPCS

## 2022-07-06 PROCEDURE — 74011250637 HC RX REV CODE- 250/637: Performed by: STUDENT IN AN ORGANIZED HEALTH CARE EDUCATION/TRAINING PROGRAM

## 2022-07-06 PROCEDURE — 99255 IP/OBS CONSLTJ NEW/EST HI 80: CPT | Performed by: PSYCHIATRY & NEUROLOGY

## 2022-07-06 PROCEDURE — 65270000029 HC RM PRIVATE

## 2022-07-06 PROCEDURE — 80061 LIPID PANEL: CPT

## 2022-07-06 RX ORDER — GUAIFENESIN 100 MG/5ML
81 LIQUID (ML) ORAL DAILY
Qty: 30 TABLET | Refills: 0 | Status: SHIPPED
Start: 2022-07-07

## 2022-07-06 RX ADMIN — ASPIRIN 81 MG: 81 TABLET, CHEWABLE ORAL at 08:51

## 2022-07-06 NOTE — DISCHARGE SUMMARY
Hospitalist Discharge Summary     Patient ID:    Yi Doll  [id number]  36 y.o.  1960    Admit date of service: 7/5/2022    Discharge date of service: 7/6/2022    Admission Diagnoses: TIA (transient ischemic attack) [G45.9]    Chronic Diagnoses:    Problem List as of 7/6/2022 Date Reviewed: 7/25/2016          Codes Class Noted - Resolved    TIA (transient ischemic attack) ICD-10-CM: G45.9  ICD-9-CM: 435.9  7/5/2022 - Present        Dizzy ICD-10-CM: R42  ICD-9-CM: 780.4  7/25/2016 - Present        Left arm numbness ICD-10-CM: R20.0  ICD-9-CM: 782.0  7/25/2016 - Present        Hypokalemia ICD-10-CM: E87.6  ICD-9-CM: 276.8  7/25/2016 - Present        Chest pain ICD-10-CM: R07.9  ICD-9-CM: 786.50  7/25/2016 - Present        Occipital headache ICD-10-CM: R51.9  ICD-9-CM: 784.0  7/25/2016 - Present        Vestibular migraine ICD-10-CM: G43.809  ICD-9-CM: 346.80  7/25/2016 - Present    Overview Signed 7/25/2016  1:17 PM by Vicenta Sullivan     -not always associated with her variable symptoms of:  Occipital HA, nausea, paresthesia                    Discharge Medications:   Current Discharge Medication List      START taking these medications    Details   aspirin 81 mg chewable tablet Take 1 Tablet by mouth daily. Qty: 30 Tablet, Refills: 0             Follow up Care:    1. Young Dennis MD in 1-2 weeks  2. Diet:  Regular Diet    Disposition:  Home. Advanced Directive:    Discharge Exam:  See today's note. CONSULTATIONS: Neurology    Significant Diagnostic Studies:   Recent Labs     07/05/22 0928   WBC 7.4   HGB 12.5   HCT 39.6        Recent Labs     07/05/22 0928      K 3.7      CO2 28   BUN 10   CREA 0.71   *   CA 9.1     Recent Labs     07/05/22 0928   ALT 27   AP 95   TBILI 0.4   TP 7.0   ALB 3.6   GLOB 3.4     Recent Labs     07/05/22 0928   INR 0.9   PTP 9.8      No results for input(s): FE, TIBC, PSAT, FERR in the last 72 hours.    No results for input(s): PH, PCO2, PO2 in the last 72 hours. No results for input(s): CPK, CKMB in the last 72 hours. No lab exists for component: TROPONINI  Lab Results   Component Value Date/Time    Glucose (POC) 97 07/05/2022 09:16 AM             HOSPITAL COURSE & TREATMENT RENDERED:   1.  LT facial and arm numbness. likely due to complicated migraine per neurology. Resolved. MRI is neg for acute stroke and CTA neg for significant stenosis.  lipids are Ok. Check A1C. Advised to contnue ASA. Neurology consult appreciated     2.  Elevated blood pressure. Better now. Not on home meds.      3.  Vitamin D deficiency-on supplements             Discharged in improved condition.     Spent 35 minutes    Signed:  Velasquez Lobo MD  7/6/2022  9:35 AM

## 2022-07-06 NOTE — PROGRESS NOTES
Reason for Admission:  R/O TIA, head pressure, vertigo, left sided facial numbness. Past Medical History:   Diagnosis Date    Occipital headache 7/25/2016                    RUR Score:      6%/ low risk               Plan for utilizing home health:      None, no DME    PCP: First and Last name:  Sancho Stuart MD     Name of Practice:    Are you a current patient: Yes/No:  yes   Approximate date of last visit:  5 months ago   Can you participate in a virtual visit with your PCP:   yes                    Current Advanced Directive/Advance Care Plan: Full Code    Healthcare Decision Maker:   Click here to complete Oceen including selection of the Oceen Relationship (ie \"Primary\")            Smiley Kimble  [phone]                  Transition of Care Plan:          Chart reviewed, demographics verified. CM role and follow up discussed. Met with patient at bedside, face mask on. Patient lives with her spouse. Patient has prescription drug coverage, uses [facility] in [place]. Patient is independent, drives, and provides self care. Patient performs ADLs independently. Current status:  Patient currently requiring medical management including ongoing assessment and monitoring. Discharge today, home with family. No CM needs identified. PLAN:  1. Monitor patient response to treatment and recommendations. 2. Medical management continues. 3. Home with family assist.  4. Patient transport home per  at discharge. 5. CM to monitor clinical progress and disposition recommendations. Care Management Interventions  PCP Verified by CM: Yes (Dr. Windy Oneal)  Mode of Transport at Discharge:  Other (see comment) (per )  Transition of Care Consult (CM Consult): Discharge Planning  Support Systems: Spouse/Significant Other  Confirm Follow Up Transport: Family  The Plan for Transition of Care is Related to the Following Treatment Goals : Return home at [facility]  Discharge Location  Patient Expects to be Discharged to[de-identified] Home with family assistance    Sami Jaramillo RN, MSN, Care manager

## 2022-07-06 NOTE — PROGRESS NOTES
Discharge instructions, including educational information on Aspirin and migraines were reviewed with patient. All questions were answered. IV site removed from right [facility] and Telemetry monitor removed. Care Plans and Education have been resolved. Patient understood to call and make follow up appointments for PCP and Neurology. Patient will be discharged home with her . Primary nurse updated.

## 2022-07-06 NOTE — DISCHARGE INSTRUCTIONS
ACUTE DIAGNOSES:  TIA (transient ischemic attack) [G45.9]    CHRONIC MEDICAL DIAGNOSES:  Problem List as of 7/6/2022 Date Reviewed: 7/25/2016          Codes Class Noted - Resolved    TIA (transient ischemic attack) ICD-10-CM: G45.9  ICD-9-CM: 435.9  7/5/2022 - Present        Dizzy ICD-10-CM: R42  ICD-9-CM: 780.4  7/25/2016 - Present        Left arm numbness ICD-10-CM: R20.0  ICD-9-CM: 782.0  7/25/2016 - Present        Hypokalemia ICD-10-CM: E87.6  ICD-9-CM: 276.8  7/25/2016 - Present        Chest pain ICD-10-CM: R07.9  ICD-9-CM: 786.50  7/25/2016 - Present        Occipital headache ICD-10-CM: R51.9  ICD-9-CM: 784.0  7/25/2016 - Present        Vestibular migraine ICD-10-CM: G43.809  ICD-9-CM: 346.80  7/25/2016 - Present    Overview Signed 7/25/2016  1:17 PM by Laura Michelle     -not always associated with her variable symptoms of:  Occipital HA, nausea, paresthesia                    DISCHARGE MEDICATIONS:          · It is important that you take the medication exactly as they are prescribed. · Keep your medication in the bottles provided by the pharmacist and keep a list of the medication names, dosages, and times to be taken in your wallet. · Do not take other medications without consulting your doctor. DIET:  Regular Diet    ACTIVITY: Activity as tolerated    ADDITIONAL INFORMATION: If you experience any of the following symptoms then please call your primary care physician or return to the emergency room if you cannot get hold of your doctor: Fever, chills, nausea, vomiting, diarrhea, change in mentation, falling, bleeding, shortness of breath. FOLLOW UP CARE:  Dr. Yoana Klein MD  you are to call and set up an appointment to see them in 5 days. Information obtained by :  I understand that if any problems occur once I am at home I am to contact my physician. I understand and acknowledge receipt of the instructions indicated above. Physician's or R.N.'s Signature                                                                  Date/Time                                                                                                                                              Patient or Representative Signature                                                          Date/Time

## 2022-07-06 NOTE — PROGRESS NOTES
[facility]  [address] [facility], [address] [place] [address]  [phone]      Medical Progress Note      NAME: Yolis Mcguire   :  1960  MRM:  [id number]    Date of service: 2022  7:28 AM       Assessment and Plan:   1. LT facial and arm numbness. Resolved. MRI is neg for acute stroke and CTA neg for significant stenosis. lipids are Ok. Check A1C. On ASA and plavix. Neurology consult      2.  Elevated blood pressure. Better now. Not on home meds.      3.  Vitamin D deficiency-on supplements          Subjective:     Chief Complaint[de-identified] Patient was seen and examined as a follow up for TIA. Chart was reviewed. denies weakness or numbness     ROS:  (bold if positive, if negative)    Tolerating PT  Tolerating Diet        Objective:     Last 24hrs VS reviewed since prior progress note.  Most recent are:    Visit Vitals  /72 (BP 1 Location: Left lower arm, BP Patient Position: At rest)   Pulse 64   Temp 97.7 °F (36.5 °C)   Resp 16   Ht 5' 5.5\" (1.664 m)   Wt 115.2 kg (253 lb 15.5 oz)   SpO2 96%   BMI 41.62 kg/m²     SpO2 Readings from Last 6 Encounters:   22 96%   16 96%   02/22/15 99%   01/31/15 99%   01/14/15 100%   14 98%            Intake/Output Summary (Last 24 hours) at 2022 9441  Last data filed at 2022 1456  Gross per 24 hour   Intake 240 ml   Output --   Net 240 ml        Physical Exam:    Gen:  Well-developed, well-nourished, in no acute distress  HEENT:  Pink conjunctivae, PERRL, hearing intact to voice, moist mucous membranes  Neck:  Supple, without masses, thyroid non-tender  Resp:  No accessory muscle use, clear breath sounds without wheezes rales or rhonchi  Card:  No murmurs, normal S1, S2 without thrills, bruits or peripheral edema  Abd:  Soft, non-tender, non-distended, normoactive bowel sounds are present, no palpable organomegaly and no detectable hernias  Lymph:  No cervical or inguinal adenopathy  Musc:  No cyanosis or clubbing  Skin: No rashes or ulcers, skin turgor is good  Neuro:  Cranial nerves are grossly intact, no focal motor weakness, follows commands appropriately  Psych:  Good insight, oriented to person, place and time, alert  __________________________________________________________________  Medications Reviewed: (see below)  Medications:     Current Facility-Administered Medications   Medication Dose Route Frequency    aspirin chewable tablet 81 mg  81 mg Oral DAILY    acetaminophen (TYLENOL) tablet 650 mg  650 mg Oral Q4H PRN    Or    acetaminophen (TYLENOL) solution 650 mg  650 mg Per NG tube Q4H PRN    Or    acetaminophen (TYLENOL) suppository 650 mg  650 mg Rectal Q4H PRN    clopidogreL (PLAVIX) tablet 75 mg  75 mg Oral DAILY        Lab Data Reviewed: (see below)  Lab Review:     Recent Labs     07/05/22 0928   WBC 7.4   HGB 12.5   HCT 39.6        Recent Labs     07/05/22 0928      K 3.7      CO2 28   *   BUN 10   CREA 0.71   CA 9.1   ALB 3.6   TBILI 0.4   ALT 27   INR 0.9     Lab Results   Component Value Date/Time    Glucose (POC) 97 07/05/2022 09:16 AM     No results for input(s): PH, PCO2, PO2, HCO3, FIO2 in the last 72 hours. Recent Labs     07/05/22 0928   INR 0.9     All Micro Results     None          I have reviewed notes of prior 24hr. Other pertinent lab: Total time spent with patient: 28 I personally reviewed chart, notes, data and current medications in the medical record. I have personally examined and treated the patient at bedside during this period.                  Care Plan discussed with: Patient, Nursing Staff and >50% of time spent in counseling and coordination of care    Discussed:  Care Plan    Prophylaxis:  Lovenox    Disposition:  Home w/Family           ___________________________________________________    Attending Physician: Shereen Short MD

## 2022-07-06 NOTE — CONSULTS
[facility] and [address] at [facility] at [address] [place] [address], [address] [facility] [address]  [phone] Office  [phone] Facsimile           Referring: Dr. Cinthia Sims    Chief Complaint   Patient presents with    Numbness     We are asked to see this 60-year-old lady in neurologic consultation regarding an acute change in her neurologic status which was manifest as left-sided facial numbness and weakness of her upper extremity. She presented to the emergency department complaining of the same. Also had loss of balance and change in her vision. In the emergency department she was complaining of dizziness weakness numbness. She had a several left-sided facial asymmetry as well as left lower leg weakness on exam.  Symptoms were 6-12 hours of duration. She was given aspirin and Plavix. CT of the head personally reviewed and unremarkable  CTA of the head and neck unremarkable  MRI of the brain unremarkable  Lipid panel with LDL 49.8  TSH normal  Coags normal  Metabolic panel unremarkable  CBC unremarkable  Hemoglobin A1c in process    This morning patient tells me she awakened with a pounding headache and went to go to the bathroom and felt dizzy. She just laid back down thinking if she went back to sleep the headache would go away as well as the dizziness. When she awakened she had some numbness of the left side of the face and felt a bit off on the left side but was able to ambulate and no real focality. Still have the numbness of the left side of the face. She decided to come in at that point. Interestingly she does have a history of migraine equivalent where she will get wavy flashing lights across her visual field of varying colors orange in the last 20 minutes to a couple of hours and then go away. She typically does not get a headache with those.   They are infrequent. No focal deficits with those. She does not smoke. She has a rare drink of alcohol. No family history of stroke. She does not take aspirin or any other medicines at home. Blood pressure has been controlled. She is a hospice nurse by profession. Past Medical History:   Diagnosis Date    Occipital headache 7/25/2016       Past Surgical History:   Procedure Laterality Date    HX CHOLECYSTECTOMY      HX HYSTERECTOMY  2017       Current Facility-Administered Medications   Medication Dose Route Frequency Provider Last Rate Last Admin    aspirin chewable tablet 81 mg  81 mg Oral DAILY Walter Ballesteros MD   81 mg at 07/05/22 1021    acetaminophen (TYLENOL) tablet 650 mg  650 mg Oral Q4H PRN Dariela Gautam MD        Or   Blase Liming acetaminophen (TYLENOL) solution 650 mg  650 mg Per NG tube Q4H PRN Dariela Gautam MD        Or   Blase Liming acetaminophen (TYLENOL) suppository 650 mg  650 mg Rectal Q4H PRN Dariela Gautam MD        clopidogreL (PLAVIX) tablet 75 mg  75 mg Oral DAILY Dariela Gautam MD            No Known Allergies    Social History     Tobacco Use    Smoking status: Former Smoker    Smokeless tobacco: Never Used   Substance Use Topics    Alcohol use: Yes     Comment: occasional glass of wine    Drug use: No       History reviewed. No pertinent family history. Review of Systems  Pertinent positives and negatives as noted. Examination  Visit Vitals  /83 (BP 1 Location: Left lower arm, BP Patient Position: At rest)   Pulse 67   Temp 97.8 °F (36.6 °C)   Resp 17   Ht 5' 5.5\" (1.664 m)   Wt 115.2 kg (253 lb 15.5 oz)   LMP 03/17/2014   SpO2 97%   BMI 41.62 kg/m²     Neurologically, she is awake, alert, and oriented with normal speech and language. Her cognition is normal.    Intact cranial nerves 2-12. No nystagmus. bilaterally. She has normal bulk and tone. She has no abnormal movement. She has no pronation or drift.   She generates full strength in the upper and lower extremities to direct confrontational testing. Reflexes are symmetrical in the upper and lower extremities bilaterally. No pathologic reflexes are elicited. Finger nose finger and rapid alternating movements are normal.      Impression/Plan  Very nice 80-year-old lady with a history of migraine equivalent who had an episode of left-sided facial numbness and dizziness in the context of a pounding intense headache and I think her symptoms were all related to migraine i.e. a migrainous phenomenon/complex migraine/migraine with aura. I do not think this was TIA and we discussed that. We did discuss that people with this type of migraine have a higher risk of stroke  For that reason I do think she should modify her risk factors for stroke and at this point her risk factors are modified in terms of cholesterol, blood pressure etc.  I do think she should be on a baby aspirin daily and she will continue this  From a neurologic standpoint I have no contraindication for her being discharged home on aspirin 81 mg  I would not have her on dual antiplatelet therapy  Follow-up in the office as needed    Pricila Reaves MD          This note was created using voice recognition software. Despite editing, there may be syntax errors.

## 2022-07-06 NOTE — PROGRESS NOTES
Hospital Follow Up with PCP  Coreen Perez DO for  7/11/2022 at 9:00am.  Patients normal provider Dr Maryam Hoang did not have any available appointments.

## 2024-01-29 ENCOUNTER — APPOINTMENT (OUTPATIENT)
Facility: HOSPITAL | Age: 64
DRG: 305 | End: 2024-01-29
Payer: COMMERCIAL

## 2024-01-29 ENCOUNTER — HOSPITAL ENCOUNTER (INPATIENT)
Facility: HOSPITAL | Age: 64
LOS: 2 days | Discharge: HOME | DRG: 305 | End: 2024-01-31
Attending: STUDENT IN AN ORGANIZED HEALTH CARE EDUCATION/TRAINING PROGRAM | Admitting: HOSPITALIST
Payer: COMMERCIAL

## 2024-01-29 DIAGNOSIS — R20.2 LEFT FACE AND LEFT ARM TINGLING: ICD-10-CM

## 2024-01-29 DIAGNOSIS — R20.0 LEFT FACIAL NUMBNESS: Primary | ICD-10-CM

## 2024-01-29 PROBLEM — I10 HTN (HYPERTENSION), BENIGN: Status: ACTIVE | Noted: 2024-01-29

## 2024-01-29 LAB
ALBUMIN SERPL-MCNC: 3.8 G/DL (ref 3.5–5)
ALBUMIN/GLOB SERPL: 1 (ref 1.1–2.2)
ALP SERPL-CCNC: 98 U/L (ref 45–117)
ALT SERPL-CCNC: 26 U/L (ref 12–78)
ANION GAP SERPL CALC-SCNC: 7 MMOL/L (ref 5–15)
AST SERPL-CCNC: 17 U/L (ref 15–37)
BASOPHILS # BLD: 0 K/UL (ref 0–0.1)
BASOPHILS NFR BLD: 1 % (ref 0–1)
BILIRUB SERPL-MCNC: 0.5 MG/DL (ref 0.2–1)
BUN SERPL-MCNC: 13 MG/DL (ref 6–20)
BUN/CREAT SERPL: 20 (ref 12–20)
CALCIUM SERPL-MCNC: 9.3 MG/DL (ref 8.5–10.1)
CHLORIDE SERPL-SCNC: 104 MMOL/L (ref 97–108)
CO2 SERPL-SCNC: 32 MMOL/L (ref 21–32)
COMMENT:: NORMAL
CREAT SERPL-MCNC: 0.64 MG/DL (ref 0.55–1.02)
DIFFERENTIAL METHOD BLD: NORMAL
EOSINOPHIL # BLD: 0.1 K/UL (ref 0–0.4)
EOSINOPHIL NFR BLD: 2 % (ref 0–7)
ERYTHROCYTE [DISTWIDTH] IN BLOOD BY AUTOMATED COUNT: 13.4 % (ref 11.5–14.5)
GLOBULIN SER CALC-MCNC: 3.7 G/DL (ref 2–4)
GLUCOSE BLD STRIP.AUTO-MCNC: 86 MG/DL (ref 65–117)
GLUCOSE SERPL-MCNC: 93 MG/DL (ref 65–100)
HCT VFR BLD AUTO: 43.1 % (ref 35–47)
HGB BLD-MCNC: 14.3 G/DL (ref 11.5–16)
IMM GRANULOCYTES # BLD AUTO: 0 K/UL (ref 0–0.04)
IMM GRANULOCYTES NFR BLD AUTO: 0 % (ref 0–0.5)
INR PPP: 1 (ref 0.9–1.1)
LYMPHOCYTES # BLD: 2.3 K/UL (ref 0.8–3.5)
LYMPHOCYTES NFR BLD: 29 % (ref 12–49)
MCH RBC QN AUTO: 29.8 PG (ref 26–34)
MCHC RBC AUTO-ENTMCNC: 33.2 G/DL (ref 30–36.5)
MCV RBC AUTO: 89.8 FL (ref 80–99)
MONOCYTES # BLD: 0.7 K/UL (ref 0–1)
MONOCYTES NFR BLD: 9 % (ref 5–13)
NEUTS SEG # BLD: 4.7 K/UL (ref 1.8–8)
NEUTS SEG NFR BLD: 59 % (ref 32–75)
NRBC # BLD: 0 K/UL (ref 0–0.01)
NRBC BLD-RTO: 0 PER 100 WBC
PLATELET # BLD AUTO: 289 K/UL (ref 150–400)
PMV BLD AUTO: 10 FL (ref 8.9–12.9)
POTASSIUM SERPL-SCNC: 3.5 MMOL/L (ref 3.5–5.1)
PROT SERPL-MCNC: 7.5 G/DL (ref 6.4–8.2)
PROTHROMBIN TIME: 9.9 SEC (ref 9–11.1)
RBC # BLD AUTO: 4.8 M/UL (ref 3.8–5.2)
SERVICE CMNT-IMP: NORMAL
SODIUM SERPL-SCNC: 143 MMOL/L (ref 136–145)
SPECIMEN HOLD: NORMAL
TROPONIN I SERPL HS-MCNC: 9 NG/L (ref 0–51)
TSH SERPL DL<=0.05 MIU/L-ACNC: 0.63 UIU/ML (ref 0.36–3.74)
WBC # BLD AUTO: 7.9 K/UL (ref 3.6–11)

## 2024-01-29 PROCEDURE — 84443 ASSAY THYROID STIM HORMONE: CPT

## 2024-01-29 PROCEDURE — 6360000004 HC RX CONTRAST MEDICATION: Performed by: STUDENT IN AN ORGANIZED HEALTH CARE EDUCATION/TRAINING PROGRAM

## 2024-01-29 PROCEDURE — 6360000002 HC RX W HCPCS: Performed by: INTERNAL MEDICINE

## 2024-01-29 PROCEDURE — 70498 CT ANGIOGRAPHY NECK: CPT

## 2024-01-29 PROCEDURE — 93005 ELECTROCARDIOGRAM TRACING: CPT | Performed by: STUDENT IN AN ORGANIZED HEALTH CARE EDUCATION/TRAINING PROGRAM

## 2024-01-29 PROCEDURE — 1100000000 HC RM PRIVATE

## 2024-01-29 PROCEDURE — 36415 COLL VENOUS BLD VENIPUNCTURE: CPT

## 2024-01-29 PROCEDURE — 85610 PROTHROMBIN TIME: CPT

## 2024-01-29 PROCEDURE — 85025 COMPLETE CBC W/AUTO DIFF WBC: CPT

## 2024-01-29 PROCEDURE — 6370000000 HC RX 637 (ALT 250 FOR IP): Performed by: INTERNAL MEDICINE

## 2024-01-29 PROCEDURE — 71045 X-RAY EXAM CHEST 1 VIEW: CPT

## 2024-01-29 PROCEDURE — 70450 CT HEAD/BRAIN W/O DYE: CPT

## 2024-01-29 PROCEDURE — 84484 ASSAY OF TROPONIN QUANT: CPT

## 2024-01-29 PROCEDURE — 6370000000 HC RX 637 (ALT 250 FOR IP): Performed by: STUDENT IN AN ORGANIZED HEALTH CARE EDUCATION/TRAINING PROGRAM

## 2024-01-29 PROCEDURE — 99285 EMERGENCY DEPT VISIT HI MDM: CPT

## 2024-01-29 PROCEDURE — 82962 GLUCOSE BLOOD TEST: CPT

## 2024-01-29 PROCEDURE — 80053 COMPREHEN METABOLIC PANEL: CPT

## 2024-01-29 PROCEDURE — 2580000003 HC RX 258: Performed by: INTERNAL MEDICINE

## 2024-01-29 RX ORDER — SODIUM CHLORIDE 0.9 % (FLUSH) 0.9 %
5-40 SYRINGE (ML) INJECTION EVERY 12 HOURS SCHEDULED
Status: DISCONTINUED | OUTPATIENT
Start: 2024-01-29 | End: 2024-01-31 | Stop reason: HOSPADM

## 2024-01-29 RX ORDER — SODIUM CHLORIDE 9 MG/ML
INJECTION, SOLUTION INTRAVENOUS PRN
Status: DISCONTINUED | OUTPATIENT
Start: 2024-01-29 | End: 2024-01-31 | Stop reason: HOSPADM

## 2024-01-29 RX ORDER — ASPIRIN 81 MG/1
81 TABLET, CHEWABLE ORAL DAILY
Status: DISCONTINUED | OUTPATIENT
Start: 2024-01-30 | End: 2024-01-31 | Stop reason: HOSPADM

## 2024-01-29 RX ORDER — ONDANSETRON 2 MG/ML
4 INJECTION INTRAMUSCULAR; INTRAVENOUS EVERY 6 HOURS PRN
Status: DISCONTINUED | OUTPATIENT
Start: 2024-01-29 | End: 2024-01-31 | Stop reason: HOSPADM

## 2024-01-29 RX ORDER — BUTALBITAL, ACETAMINOPHEN AND CAFFEINE 50; 325; 40 MG/1; MG/1; MG/1
1 TABLET ORAL EVERY 4 HOURS PRN
Status: DISCONTINUED | OUTPATIENT
Start: 2024-01-29 | End: 2024-01-31 | Stop reason: HOSPADM

## 2024-01-29 RX ORDER — SODIUM CHLORIDE 0.9 % (FLUSH) 0.9 %
5-40 SYRINGE (ML) INJECTION PRN
Status: DISCONTINUED | OUTPATIENT
Start: 2024-01-29 | End: 2024-01-31 | Stop reason: HOSPADM

## 2024-01-29 RX ORDER — ONDANSETRON 4 MG/1
4 TABLET, ORALLY DISINTEGRATING ORAL EVERY 8 HOURS PRN
Status: DISCONTINUED | OUTPATIENT
Start: 2024-01-29 | End: 2024-01-31 | Stop reason: HOSPADM

## 2024-01-29 RX ORDER — ASPIRIN 81 MG/1
324 TABLET, CHEWABLE ORAL
Status: COMPLETED | OUTPATIENT
Start: 2024-01-29 | End: 2024-01-29

## 2024-01-29 RX ORDER — POLYETHYLENE GLYCOL 3350 17 G/17G
17 POWDER, FOR SOLUTION ORAL DAILY PRN
Status: DISCONTINUED | OUTPATIENT
Start: 2024-01-29 | End: 2024-01-31 | Stop reason: HOSPADM

## 2024-01-29 RX ORDER — ACETAMINOPHEN 325 MG/1
650 TABLET ORAL EVERY 8 HOURS PRN
Status: DISCONTINUED | OUTPATIENT
Start: 2024-01-29 | End: 2024-01-31 | Stop reason: HOSPADM

## 2024-01-29 RX ORDER — ROSUVASTATIN CALCIUM 10 MG/1
40 TABLET, COATED ORAL NIGHTLY
Status: DISCONTINUED | OUTPATIENT
Start: 2024-01-29 | End: 2024-01-31 | Stop reason: HOSPADM

## 2024-01-29 RX ORDER — VALSARTAN 80 MG/1
20 TABLET ORAL DAILY
Status: DISCONTINUED | OUTPATIENT
Start: 2024-01-30 | End: 2024-01-31 | Stop reason: HOSPADM

## 2024-01-29 RX ORDER — ENOXAPARIN SODIUM 100 MG/ML
30 INJECTION SUBCUTANEOUS 2 TIMES DAILY
Status: DISCONTINUED | OUTPATIENT
Start: 2024-01-29 | End: 2024-01-31 | Stop reason: HOSPADM

## 2024-01-29 RX ORDER — ASPIRIN 300 MG/1
300 SUPPOSITORY RECTAL DAILY
Status: DISCONTINUED | OUTPATIENT
Start: 2024-01-30 | End: 2024-01-31 | Stop reason: HOSPADM

## 2024-01-29 RX ADMIN — BUTALBITAL, ACETAMINOPHEN, AND CAFFEINE 1 TABLET: 50; 325; 40 TABLET ORAL at 21:18

## 2024-01-29 RX ADMIN — ENOXAPARIN SODIUM 30 MG: 100 INJECTION SUBCUTANEOUS at 20:11

## 2024-01-29 RX ADMIN — ASPIRIN 324 MG: 81 TABLET, CHEWABLE ORAL at 16:33

## 2024-01-29 RX ADMIN — IOPAMIDOL 100 ML: 755 INJECTION, SOLUTION INTRAVENOUS at 14:42

## 2024-01-29 RX ADMIN — ROSUVASTATIN CALCIUM 40 MG: 10 TABLET, COATED ORAL at 20:17

## 2024-01-29 RX ADMIN — SODIUM CHLORIDE, PRESERVATIVE FREE 10 ML: 5 INJECTION INTRAVENOUS at 20:11

## 2024-01-29 ASSESSMENT — PAIN SCALES - GENERAL
PAINLEVEL_OUTOF10: 2
PAINLEVEL_OUTOF10: 4
PAINLEVEL_OUTOF10: 4
PAINLEVEL_OUTOF10: 7
PAINLEVEL_OUTOF10: 2
PAINLEVEL_OUTOF10: 4
PAINLEVEL_OUTOF10: 4

## 2024-01-29 ASSESSMENT — PAIN - FUNCTIONAL ASSESSMENT
PAIN_FUNCTIONAL_ASSESSMENT: 0-10

## 2024-01-29 ASSESSMENT — PAIN DESCRIPTION - LOCATION
LOCATION: HEAD

## 2024-01-29 ASSESSMENT — PAIN DESCRIPTION - PAIN TYPE
TYPE: ACUTE PAIN
TYPE: ACUTE PAIN

## 2024-01-29 ASSESSMENT — ENCOUNTER SYMPTOMS
SHORTNESS OF BREATH: 0
ABDOMINAL PAIN: 0

## 2024-01-29 ASSESSMENT — PAIN DESCRIPTION - DESCRIPTORS
DESCRIPTORS: ACHING
DESCRIPTORS: THROBBING
DESCRIPTORS: THROBBING

## 2024-01-29 NOTE — ED NOTES
TRANSFER - OUT REPORT:    Verbal report given to ANGELITA Win on Shelby Wei  being transferred to ER for routine progression of patient care       Report consisted of patient's Situation, Background, Assessment and   Recommendations(SBAR).     Information from the following report(s) Nurse Handoff Report, ED SBAR, and Adult Overview was reviewed with the receiving nurse.    [place] Fall Assessment:    Presents to emergency department  because of falls (Syncope, seizure, or loss of consciousness): No  Age > 70: No  Altered Mental Status, Intoxication with alcohol or substance confusion (Disorientation, impaired judgment, poor safety awaremess, or inability to follow instructions): No  Impaired Mobility: Ambulates or transfers with assistive devices or assistance; Unable to ambulate or transer.: No  Nursing Judgement: No          Lines:   Peripheral IV 01/29/24 Proximal;Right Forearm (Active)        Opportunity for questions and clarification was provided.      Patient transported with:  Monitor    Will just need an order for neuro checks, They have been done, but no admission orders for it

## 2024-01-29 NOTE — ED PROVIDER NOTES
either signs or Co-signs this chart in the absence of a cardiologist.        RADIOLOGY:   Non-plain film images such as CT, Ultrasound and MRI are read by the radiologist. Plain radiographic images are visualized and preliminarily interpreted by the emergency physician with the below findings:        Interpretation per the Radiologist below, if available at the time of this note:    XR CHEST PORTABLE   Final Result   No acute cardiopulmonary disease.          CTA HEAD NECK W CONTRAST   Final Result      1. No acute large vessel occlusion, arterial dissection, or hemodynamically   significant stenosis.   2. CT perfusion not performed.  If high clinical concern for acute process,   consider MRI (unless contraindicated).         CT HEAD WO CONTRAST   Final Result         No acute abnormality           LABS:  Labs Reviewed   COMPREHENSIVE METABOLIC PANEL - Abnormal; Notable for the following components:       Result Value    Albumin/Globulin Ratio 1.0 (*)     All other components within normal limits   EXTRA TUBES HOLD   CBC WITH AUTO DIFFERENTIAL   PROTIME-INR   TROPONIN   POCT GLUCOSE   POCT GLUCOSE       All other labs were within normal range or not returned as of this dictation.    EMERGENCY DEPARTMENT COURSE and DIFFERENTIAL DIAGNOSIS/MDM:   Vitals:    Vitals:    01/29/24 1315 01/29/24 1500 01/29/24 1514   BP: (!) 161/93 (!) 151/84    Pulse: 62     Resp: 20 17    Temp: 97.8 °F (36.6 °C)     TempSrc: Oral     SpO2: 97% 98%    Weight: 108.9 kg (240 lb)  112.5 kg (248 lb)   Height: 1.676 m (5' 6\")             Medical Decision Making  Differential diagnosis includes stroke, electrolyte abnormality, hypertensive encephalopathy, hypertensive emergency, complex migraine    Amount and/or Complexity of Data Reviewed  Labs: ordered.  Radiology: ordered.  ECG/medicine tests: ordered.    Risk  OTC drugs.  Prescription drug management.  Decision regarding hospitalization.            REASSESSMENT     ED Course as of 01/29/24

## 2024-01-29 NOTE — ED TRIAGE NOTES
Reports headache this morning with elevated BP took BP meds help some still having headache and slight dizziness

## 2024-01-29 NOTE — ED NOTES
63-year-old female with history of TIA presents with headaches and elevated blood pressure readings.  Woke up at 730 this morning.  Shortly after, felt dizzy, lightheaded, 15-20 minutes of right face numbness/tingling.  Blood pressure 240s over 140s at that time.  Recently started valsartan on Tuesday.  Reports some similarities to her history of TIA.  Afebrile, hypertensive otherwise VSS.  On exam there is decreased sensation to the right face and right arm. Remainder of neuroexam unremarkable.  Notified attending Dr. St who agreed to assume care for patient.  Code stroke level 2 called.     Abbi Gomez PA-C  01/29/24 3470

## 2024-01-30 ENCOUNTER — APPOINTMENT (OUTPATIENT)
Facility: HOSPITAL | Age: 64
DRG: 305 | End: 2024-01-30
Payer: COMMERCIAL

## 2024-01-30 LAB
CHOLEST SERPL-MCNC: 136 MG/DL
EST. AVERAGE GLUCOSE BLD GHB EST-MCNC: 108 MG/DL
HBA1C MFR BLD: 5.4 % (ref 4–5.6)
HDLC SERPL-MCNC: 51 MG/DL
HDLC SERPL: 2.7 (ref 0–5)
LDLC SERPL CALC-MCNC: 64.2 MG/DL (ref 0–100)
TRIGL SERPL-MCNC: 104 MG/DL
VLDLC SERPL CALC-MCNC: 20.8 MG/DL

## 2024-01-30 PROCEDURE — 2580000003 HC RX 258: Performed by: INTERNAL MEDICINE

## 2024-01-30 PROCEDURE — 6360000002 HC RX W HCPCS: Performed by: INTERNAL MEDICINE

## 2024-01-30 PROCEDURE — 6370000000 HC RX 637 (ALT 250 FOR IP): Performed by: INTERNAL MEDICINE

## 2024-01-30 PROCEDURE — 94761 N-INVAS EAR/PLS OXIMETRY MLT: CPT

## 2024-01-30 PROCEDURE — 2060000000 HC ICU INTERMEDIATE R&B

## 2024-01-30 PROCEDURE — 97116 GAIT TRAINING THERAPY: CPT

## 2024-01-30 PROCEDURE — 36415 COLL VENOUS BLD VENIPUNCTURE: CPT

## 2024-01-30 PROCEDURE — 80061 LIPID PANEL: CPT

## 2024-01-30 PROCEDURE — 70551 MRI BRAIN STEM W/O DYE: CPT

## 2024-01-30 PROCEDURE — 97161 PT EVAL LOW COMPLEX 20 MIN: CPT

## 2024-01-30 PROCEDURE — 99255 IP/OBS CONSLTJ NEW/EST HI 80: CPT | Performed by: PSYCHIATRY & NEUROLOGY

## 2024-01-30 PROCEDURE — 83036 HEMOGLOBIN GLYCOSYLATED A1C: CPT

## 2024-01-30 RX ORDER — VALSARTAN 40 MG/1
20 TABLET ORAL DAILY
Qty: 30 TABLET | Refills: 0 | Status: SHIPPED | OUTPATIENT
Start: 2024-01-31

## 2024-01-30 RX ORDER — BUTALBITAL, ACETAMINOPHEN AND CAFFEINE 50; 325; 40 MG/1; MG/1; MG/1
1 TABLET ORAL EVERY 6 HOURS PRN
Qty: 28 TABLET | Refills: 0 | OUTPATIENT
Start: 2024-01-30 | End: 2024-01-30

## 2024-01-30 RX ORDER — ROSUVASTATIN CALCIUM 40 MG/1
40 TABLET, COATED ORAL NIGHTLY
Qty: 30 TABLET | Refills: 0 | OUTPATIENT
Start: 2024-01-30 | End: 2024-01-30

## 2024-01-30 RX ORDER — VALSARTAN 40 MG/1
20 TABLET ORAL DAILY
Qty: 30 TABLET | Refills: 0 | OUTPATIENT
Start: 2024-01-31 | End: 2024-01-30

## 2024-01-30 RX ORDER — ROSUVASTATIN CALCIUM 40 MG/1
40 TABLET, COATED ORAL NIGHTLY
Qty: 30 TABLET | Refills: 0 | Status: SHIPPED | OUTPATIENT
Start: 2024-01-30

## 2024-01-30 RX ORDER — BUTALBITAL, ACETAMINOPHEN AND CAFFEINE 50; 325; 40 MG/1; MG/1; MG/1
1 TABLET ORAL EVERY 6 HOURS PRN
Qty: 28 TABLET | Refills: 0 | Status: SHIPPED | OUTPATIENT
Start: 2024-01-30 | End: 2024-02-06

## 2024-01-30 RX ADMIN — BUTALBITAL, ACETAMINOPHEN, AND CAFFEINE 1 TABLET: 50; 325; 40 TABLET ORAL at 08:47

## 2024-01-30 RX ADMIN — ACETAMINOPHEN 650 MG: 325 TABLET ORAL at 15:27

## 2024-01-30 RX ADMIN — ACETAMINOPHEN 650 MG: 325 TABLET ORAL at 04:02

## 2024-01-30 RX ADMIN — VALSARTAN 20 MG: 80 TABLET ORAL at 08:47

## 2024-01-30 RX ADMIN — ASPIRIN 81 MG: 81 TABLET, CHEWABLE ORAL at 08:47

## 2024-01-30 RX ADMIN — ENOXAPARIN SODIUM 30 MG: 100 INJECTION SUBCUTANEOUS at 08:48

## 2024-01-30 RX ADMIN — SODIUM CHLORIDE, PRESERVATIVE FREE 10 ML: 5 INJECTION INTRAVENOUS at 08:46

## 2024-01-30 RX ADMIN — ENOXAPARIN SODIUM 30 MG: 100 INJECTION SUBCUTANEOUS at 21:38

## 2024-01-30 RX ADMIN — SODIUM CHLORIDE, PRESERVATIVE FREE 10 ML: 5 INJECTION INTRAVENOUS at 21:39

## 2024-01-30 RX ADMIN — ROSUVASTATIN CALCIUM 40 MG: 10 TABLET, COATED ORAL at 21:39

## 2024-01-30 ASSESSMENT — PAIN DESCRIPTION - LOCATION
LOCATION: HEAD

## 2024-01-30 ASSESSMENT — PAIN SCALES - GENERAL
PAINLEVEL_OUTOF10: 0
PAINLEVEL_OUTOF10: 4
PAINLEVEL_OUTOF10: 0
PAINLEVEL_OUTOF10: 3
PAINLEVEL_OUTOF10: 4
PAINLEVEL_OUTOF10: 4
PAINLEVEL_OUTOF10: 5

## 2024-01-30 NOTE — H&P
hemoptysis, dyspnea or pleuritic pain.  Cardiovascular ROS: no chest pain, palpitations, orthopnea, PND or syncope  Endocrine ROS: no polydispsia, polyuria, heat or cold intolerance or major weight change.  Gastrointestinal ROS: no dysphagia, abdominal pain, nausea, vomiting or diarrhea    Genito-Urinary ROS: no dysuria, frequency, hematuria, retention or flank pain  Musculoskeletal ROS: no joint pain, swelling or muscular tenderness  Neurological ROS: no confusion, focal weakness other than the numbness as noted above.   Psychiatric ROS: no depression, anxiety, mood swings  Dermatological ROS: no rash, pruritis, or urticaria  Heme-Lymph ROS: no swollen glands, bleeding    Examination:    Constitutional:  BP (!) 148/67   Pulse 59   Temp 98.5 °F (36.9 °C) (Oral)   Resp 16   Ht 1.676 m (5' 6\")   Wt 112.5 kg (248 lb)   SpO2 98%   BMI 40.03 kg/m²       General:  Weak and ill looking patient in no acute distress  Eyes: Pink conjunctivae, PERRLA with no discharge. Normal eye movements  Ear, Nose, Mouth & Throat: No ottorrhea, rhinorrhea, non tender sinuses, dry mucous membranes  Respiratory:  No accessory muscle use, clear breath sounds without crackles or wheezes  Cardiovascular:  No JVD or murmurs, regular and normal S1, S2 without thrills, bruits or peripheral edema.   GI & :  Soft abdomen, non-tender, non-distended, normoactive bowel sounds with no palpable organomegaly  Musculoskeletal:  No cyanosis, clubbing, atrophy or deformities  Skin:  No rashes, bruising or ulcers   Neurological: Awake and alert, speech is clear, mild left facial numbness. Otherwise CNs 2-12 are grossly intact and otherwise non focal  Psychiatric:  Has a good insight and is oriented x 3  ________________________________________________________________________    Data Review: I have reviewed reports and independently interpreted the following  diagnostic tests    Diagnostic testing:    Laboratory data reviewed and independently

## 2024-01-30 NOTE — DISCHARGE INSTRUCTIONS
if:    Your blood pressure is much higher than normal (such as 180/110 or higher).     You think high blood pressure is causing symptoms such as:  Severe headache.  Blurry vision.    Watch closely for changes in your health, and be sure to contact your doctor if:    You do not get better as expected.   Where can you learn more?  Go to [email or web address] and sign in to your [employer] account. Enter F644 in the Search Health Information box to learn more about \"Elevated Blood Pressure: Care Instructions.\"     If you do not have an account, please click on the \"Sign Up Now\" link.  Current as of: May 10, 2017  Content Version: 11.6  © 1003-9039 [employer]. Care instructions adapted under license by [employer]. If you have questions about a medical condition or this instruction, always ask your healthcare professional. [employer] disclaims any warranty or liability for your use of this information.      MEDICATIONS:       It is important that you take the medication exactly as they are prescribed.   Keep your medication in the bottles provided by the pharmacist and keep a list of the medication names, dosages, and times to be taken in your wallet.   Do not take other medications without consulting your doctor     Pain Management: per above medications    What to do at Home    Recommended diet:  cardiac diet    Recommended activity: activity as tolerated    1) Return to the hospital if you feel worse    2) If you experience any of the following symptoms then please call your primary care physician or return to the emergency room if you cannot get hold of your doctor:  Fever, chills, nausea, vomiting, diarrhea, change in mentation, falling, bleeding, shortness of breath, chest pain, severe headache, severe abdominal pain.     Follow Up:  Alecia Alcantara MD  [address]  [facility] [id number] [phone]  Schedule an appointment as soon as possible for a

## 2024-01-30 NOTE — CONSULTS
INPATIENT NEUROLOGY CONSULT NOTE    NAME:   Shelby Wei  MRN:   [id number]    ADMISSION DATE:  1/29/2024  1:54 PM    REASON FOR CONSULT:  left facial numbness    REQUESTING PROVIDER:  Yamil Barba MD    HPI:  63 y.o. female who PMHx HTN, Occipital migraine, TIA    Pt reports that she had woke up yesterday AM felt fine.  Sometime later in the morning she had bent over to pick something up and felt very dizzy, more than she would expect.  Sat down and checked BP and it was very high 200/ 100s.  Checked it two more times and it was still very high.  She didn't feel well so laid down and kept checking BP periodically.  The SBP came down to 150s-160s but DBP was still in the 100 range.  She had been started on Valsartan recently due to elevated BP at a pre-op check. She describes later in morning she started having numbness/ tingling of left face and arm, as well as a moderate-severe headache.      I reviewed the Brain MRI images dated 1-30-24 on PACS and to my view: no white matter lesions, no encephalomalacia, normal cerebral volume for age, no diffusion abnormality, normal appearing non-contrast Brain MRI for age    MRI Brain Report: FINDINGS:  Ventricles:  Midline, no hydrocephalus.   Brain Parenchyma/Brainstem:  Normal for age. No acute infarction.  Intracranial Hemorrhage:  None.   Basal Cisterns:  Normal.   Flow Voids:  Normal.  Additional Comments:  N/A.    IMPRESSION:  No significant abnormality or acute process      CTA Head/ Neck:  IMPRESSION:  1. No acute large vessel occlusion, arterial dissection, or hemodynamically significant stenosis.  2. CT perfusion not performed.  If high clinical concern for acute process, consider MRI (unless contraindicated).    Labs reviewed:  CBC normal  CMP normal  INR 1.0  TSH 0.63  Tchol 136, Trig 104, HDL 51, LDL 64      Vitals:    01/29/24 2300 01/30/24 0000 01/30/24 0754 01/30/24 0847   BP:  139/71 122/64 137/81   Pulse: 62 60 62    Resp:  16 16    Temp:  97.8 °F (36.6 °C)

## 2024-01-30 NOTE — ED NOTES
Bedside and Verbal shift change report given to Bonnie (oncoming nurse) by ABBY (offgoing nurse). Report included the following information ED Encounter Summary, ED SBAR, MAR, and Recent Results.

## 2024-01-31 ENCOUNTER — APPOINTMENT (OUTPATIENT)
Facility: HOSPITAL | Age: 64
DRG: 305 | End: 2024-01-31
Attending: INTERNAL MEDICINE
Payer: COMMERCIAL

## 2024-01-31 VITALS
SYSTOLIC BLOOD PRESSURE: 139 MMHG | DIASTOLIC BLOOD PRESSURE: 78 MMHG | BODY MASS INDEX: 40.66 KG/M2 | RESPIRATION RATE: 19 BRPM | WEIGHT: 253 LBS | OXYGEN SATURATION: 100 % | TEMPERATURE: 97.5 F | HEIGHT: 66 IN | HEART RATE: 63 BPM

## 2024-01-31 LAB
ECHO AO ASC DIAM: 2.8 CM
ECHO AO ASCENDING AORTA INDEX: 1.27 CM/M2
ECHO AV AREA PEAK VELOCITY: 2.1 CM2
ECHO AV AREA VTI: 2.1 CM2
ECHO AV AREA/BSA PEAK VELOCITY: 1 CM2/M2
ECHO AV AREA/BSA VTI: 1 CM2/M2
ECHO AV MEAN GRADIENT: 6 MMHG
ECHO AV MEAN VELOCITY: 1.2 M/S
ECHO AV PEAK GRADIENT: 11 MMHG
ECHO AV PEAK VELOCITY: 1.7 M/S
ECHO AV VELOCITY RATIO: 0.71
ECHO AV VTI: 34 CM
ECHO BSA: 2.31 M2
ECHO LA DIAMETER INDEX: 1.4 CM/M2
ECHO LA DIAMETER: 3.1 CM
ECHO LA VOL A-L A4C: 35 ML (ref 22–52)
ECHO LA VOL MOD A4C: 30 ML (ref 22–52)
ECHO LA VOLUME INDEX A-L A4C: 16 ML/M2 (ref 16–34)
ECHO LA VOLUME INDEX MOD A4C: 14 ML/M2 (ref 16–34)
ECHO LV E' LATERAL VELOCITY: 9 CM/S
ECHO LV E' SEPTAL VELOCITY: 8 CM/S
ECHO LV EDV A4C: 134 ML
ECHO LV EDV INDEX A4C: 61 ML/M2
ECHO LV EJECTION FRACTION A4C: 56 %
ECHO LV ESV A4C: 59 ML
ECHO LV ESV INDEX A4C: 27 ML/M2
ECHO LV FRACTIONAL SHORTENING: 22 % (ref 28–44)
ECHO LV INTERNAL DIMENSION DIASTOLE INDEX: 2.08 CM/M2
ECHO LV INTERNAL DIMENSION DIASTOLIC: 4.6 CM (ref 3.9–5.3)
ECHO LV INTERNAL DIMENSION SYSTOLIC INDEX: 1.63 CM/M2
ECHO LV INTERNAL DIMENSION SYSTOLIC: 3.6 CM
ECHO LV IVSD: 1.1 CM (ref 0.6–0.9)
ECHO LV MASS 2D: 192.9 G (ref 67–162)
ECHO LV MASS INDEX 2D: 87.3 G/M2 (ref 43–95)
ECHO LV POSTERIOR WALL DIASTOLIC: 1.2 CM (ref 0.6–0.9)
ECHO LV RELATIVE WALL THICKNESS RATIO: 0.52
ECHO LVOT AREA: 3.1 CM2
ECHO LVOT AV VTI INDEX: 0.68
ECHO LVOT DIAM: 2 CM
ECHO LVOT MEAN GRADIENT: 3 MMHG
ECHO LVOT PEAK GRADIENT: 5 MMHG
ECHO LVOT PEAK VELOCITY: 1.2 M/S
ECHO LVOT STROKE VOLUME INDEX: 32.7 ML/M2
ECHO LVOT SV: 72.2 ML
ECHO LVOT VTI: 23 CM
ECHO MV A VELOCITY: 0.87 M/S
ECHO MV AREA VTI: 2.4 CM2
ECHO MV E DECELERATION TIME (DT): 257.6 MS
ECHO MV E VELOCITY: 0.8 M/S
ECHO MV E/A RATIO: 0.92
ECHO MV E/E' LATERAL: 8.89
ECHO MV E/E' RATIO (AVERAGED): 9.44
ECHO MV LVOT VTI INDEX: 1.31
ECHO MV MAX VELOCITY: 0.9 M/S
ECHO MV MEAN GRADIENT: 1 MMHG
ECHO MV MEAN VELOCITY: 0.5 M/S
ECHO MV PEAK GRADIENT: 3 MMHG
ECHO MV VTI: 30.2 CM
ECHO PV MAX VELOCITY: 0.9 M/S
ECHO PV PEAK GRADIENT: 3 MMHG
ECHO RV INTERNAL DIMENSION: 3.9 CM
ECHO RV TAPSE: 2.4 CM (ref 1.7–?)
EKG ATRIAL RATE: 57 BPM
EKG DIAGNOSIS: NORMAL
EKG P AXIS: 43 DEGREES
EKG P-R INTERVAL: 156 MS
EKG Q-T INTERVAL: 438 MS
EKG QRS DURATION: 96 MS
EKG QTC CALCULATION (BAZETT): 426 MS
EKG R AXIS: 58 DEGREES
EKG T AXIS: 14 DEGREES
EKG VENTRICULAR RATE: 57 BPM

## 2024-01-31 PROCEDURE — 6370000000 HC RX 637 (ALT 250 FOR IP): Performed by: INTERNAL MEDICINE

## 2024-01-31 PROCEDURE — 6360000002 HC RX W HCPCS: Performed by: INTERNAL MEDICINE

## 2024-01-31 PROCEDURE — 94761 N-INVAS EAR/PLS OXIMETRY MLT: CPT

## 2024-01-31 PROCEDURE — 93306 TTE W/DOPPLER COMPLETE: CPT

## 2024-01-31 PROCEDURE — 93010 ELECTROCARDIOGRAM REPORT: CPT | Performed by: INTERNAL MEDICINE

## 2024-01-31 PROCEDURE — 2580000003 HC RX 258: Performed by: INTERNAL MEDICINE

## 2024-01-31 RX ADMIN — VALSARTAN 20 MG: 80 TABLET ORAL at 09:43

## 2024-01-31 RX ADMIN — ENOXAPARIN SODIUM 30 MG: 100 INJECTION SUBCUTANEOUS at 09:30

## 2024-01-31 RX ADMIN — SODIUM CHLORIDE, PRESERVATIVE FREE 10 ML: 5 INJECTION INTRAVENOUS at 09:42

## 2024-01-31 RX ADMIN — ASPIRIN 81 MG: 81 TABLET, CHEWABLE ORAL at 09:43

## 2024-01-31 ASSESSMENT — PAIN - FUNCTIONAL ASSESSMENT
PAIN_FUNCTIONAL_ASSESSMENT: NONE - DENIES PAIN

## 2024-01-31 NOTE — PROGRESS NOTES
Physician Progress Note      PATIENT:               JACK MANTILLA  [facility] #:                  [id number]  :                       1960  ADMIT DATE:       2024 1:54 PM  DISCH DATE:  RESPONDING  PROVIDER #:        Wendy Soriano DO          QUERY TEXT:    Good afternoon.    Pt admitted on  with left facial numbness and persistent headaches. Noted   documentation of \"hypertensive urgency causing headache and left face/arm   numbness\" in  Neurology consult note.    If possible, please document in progress notes and discharge summary:      The medical record reflects the following:    Risk Factors: 63 yr old female, HTN    Clinical Indicators: from  Neurology consult: \"Hypertensive urgency   causing headache and left face/ arm numbness\";  Head CT: No significant   abnormality or acute process;  CTA head/neck: 1. No acute large vessel   occlusion, arterial dissection, or hemodynamically significant stenosis.2. CT   perfusion not performed.  If high clinical concern for acute process,  consider MRI (unless contraindicated);  Brain MRI: No significant   abnormality or acute process;  VS 13:01 /93  ?    Treatment: Neurology consult, Brain MRI, Head CT, CTA head/neck, vital signs   per unit protocol, Valsartan, Fiorocet      Thank you,  Margarette Morse RN, CDI  Options provided:  -- Hypertensive urgency causing headache and left face/arm numbness confirmed   present on admission  -- Hypertensive urgency ruled out  -- Other - I will add my own diagnosis  -- Disagree - Not applicable / Not valid  -- Disagree - Clinically unable to determine / Unknown  -- Refer to Clinical Documentation Reviewer    PROVIDER RESPONSE TEXT:    The diagnosis of hypertensive urgency causing headache and left face/arm   numbness was confirmed as present on admission.    Query created by: Margarette Morse on 2024 12:43 PM      Electronically signed by:  Wendy Soriano DO 2024 2:46 PM          
0700: Bedside and Verbal shift change report given to Stephanie RAMIREZ RN (oncoming nurse) by ANGELITA Cornelius (offgoing nurse). Report included the following information Nurse Handoff Report, ED SBAR, Adult Overview, Intake/Output, MAR, Recent Results, Med Rec Status, and Cardiac Rhythm SR .     
2055: Patient is denying HA, numbness/tingling/dizziness. Neuro assessment is negative at this time. Report given to ANGELITA Hernandez. Patient transferring to room 321.   
2300  Bedside shift change report given to Ashli (oncoming nurse) by ANGELITA Hernandez (offgoing nurse). Report included the following information SBAR, Kardex, ED Summary, Intake/Output, and Recent Results.        0700  Bedside shift change report given to ANGELITA Brown (oncoming nurse) by Ashli (offgoing nurse). Report included the following information SBAR, Kardex, ED Summary, Intake/Output, and Recent Results.         This patient was assisted with Intentional Toileting every 2 hours during this shift as appropriate.  Documentation of ambulation and output reflected on Flowsheet as appropriate.  Purposeful hourly rounding was completed using AIDET and 5Ps.  Outcomes of PHR documented as they occurred. Bed alarm in use as appropriate.  Dual Suction and ambubag in place.      
Attempted to call report to PCC RN. RN not available at this time.  
Occupational therapy note    1130am  Spoke with PT who reports patient at baseline and with no acute care therapy needs.  Will complete orders.        0919am  Orders received, chart reviewed.  Patient currently off the floor and not available for OT evaluation at this time.    Aga Bolivar MS OTR/L    
PHYSICAL THERAPY EVALUATION/DISCHARGE    Patient: Shelby Wei (63 y.o. female)  Date: 1/30/2024  Primary Diagnosis: Left facial numbness [R20.0]       Precautions: Fall Risk                      ASSESSMENT AND RECOMMENDATIONS:  Based on the objective data below, the patient presented with left facial numbness and weakness.  Patient now with just slight residual left facial numbness and all other symptoms have resolved.  Patient is Independent for all mobility.  She demonstrated no loss of balance or instability with upright activity.  Vitals stable.      Functional Outcome Measure:  The patient scored 44/56 on the WRIGHT outcome measure which is indicative of low fall risk.          Further skilled acute physical therapy is not indicated at this time.       PLAN :  Recommendation for discharge: (in order for the patient to meet his/her long term goals): No skilled physical therapy    Other factors to consider for discharge: no additional factors    IF patient discharges home will need the following DME: none       SUBJECTIVE:   Patient stated “yeah its better.”    OBJECTIVE DATA SUMMARY:     Past Medical History:   Diagnosis Date    Occipital headache 7/25/2016     Past Surgical History:   Procedure Laterality Date    CHOLECYSTECTOMY      HYSTERECTOMY (CERVIX STATUS UNKNOWN)  2017       Home Situation and Prior Level of Function: see above  Social/Functional History  Lives With: Spouse, Daughter  Type of Home: Apartment  Home Layout: One level  Home Access: Stairs to enter with rails  Entrance Stairs - Number of Steps: 8  Entrance Stairs - Rails: Both  Home Equipment: None  Has the patient had two or more falls in the past year or any fall with injury in the past year?: No  Critical Behavior:        Vitals    Blood pressure Heart rate(bpm) Oxygen saturation  Pre-activity 134/80   63  During activity  Post activity 137/81   74      Strength:    Strength: Within functional limits    Tone & Sensation:   Tone: 
Stroke Education provided to patient and the following topics were discussed    1. Patients personal risk factors for stroke are hypertension    2. Warning signs of Stroke:        * Sudden numbness or weakness of the face, arm or leg, especially on one side of          The body            * Sudden confusion, trouble speaking or understanding        * Sudden trouble seeing in one or both eyes        * Sudden trouble walking, dizziness, loss of balance or coordination        * Sudden severe headache with no known cause      3. Importance of activation Emergency Medical Services ( 9-1-1 ) immediately if experience any warning signs of stroke.    4. Be sure and schedule a follow-up appointment with your primary care doctor or any specialists as instructed.     5. You must take medicine every day to treat your risk factors for stroke.  Be sure to take your medicines exactly as your doctor tells you: no more, no less.  Know what your medicines are for , what they do.  Anti-thrombotics /anticoagulants can help prevent strokes.  You are taking the following medicine(s)  aspirin    6.  Smoking and second-hand smoke greatly increase your risk of stroke, cardiovascular disease and death. Smoking history never    7. Information provided was [facility] Stroke Education Binder, Stroke Handouts, or Verbal Education    8. Documentation of teaching completed in Patient Education Activity?  yes   
To assist primary nurse with discharge, I have completed the AVS Med-updates and added information on new medications to the AVS. AVS was not printed at this time - discharge pending on echo results.  
PRN    0.9 % sodium chloride infusion   IntraVENous PRN    ondansetron (ZOFRAN-ODT) disintegrating tablet 4 mg  4 mg Oral Q8H PRN    Or    ondansetron (ZOFRAN) injection 4 mg  4 mg IntraVENous Q6H PRN    polyethylene glycol (GLYCOLAX) packet 17 g  17 g Oral Daily PRN    enoxaparin Sodium (LOVENOX) injection 30 mg  30 mg SubCUTAneous BID    rosuvastatin (CRESTOR) tablet 40 mg  40 mg Oral Nightly    aspirin chewable tablet 81 mg  81 mg Oral Daily    Or    aspirin suppository 300 mg  300 mg Rectal Daily    acetaminophen (TYLENOL) tablet 650 mg  650 mg Oral Q8H PRN    valsartan (DIOVAN) tablet 20 mg  20 mg Oral Daily    butalbital-acetaminophen-caffeine (FIORICET, ESGIC) per tablet 1 tablet  1 tablet Oral Q4H PRN          Lab Review:     Recent Labs     01/29/24  1454   WBC 7.9   HGB 14.3   HCT 43.1        Recent Labs     01/29/24  1454      K 3.5      CO2 32   BUN 13   ALT 26   INR 1.0     No results found for: \"GLUCPOC\"       Assessment / Plan:     Left facial numbness / Persistent headaches / Hx TIA POA: possibly due to hypertensive urgency. No e/o CVA and low s/f for recurrent TIA. MRI brain with no acute concerns. Check Echocardiogram. LDL wnl. Continue Asprin and Crestor. Fioricet prn. Neurology evaluated      Hypertensive urgency/ Hx of HTN (hypertension), benign POA:  Valsartan started. FU PCP OP         Total time spent with patient: 35 minutes **I personally saw and examined the patient during this time period**                 Care Plan discussed with: Patient and Nursing Staff    Discussed:  Care Plan    Prophylaxis:  Lovenox    Disposition:  Home w/Family           ___________________________________________________    Attending Physician: Wendy Soriano DO

## 2024-01-31 NOTE — DISCHARGE SUMMARY
Patient not discharged  
person, place and time, alert    Disposition: home  Discharge Condition: Stable    Patient Instructions:      Medication List        START taking these medications      butalbital-acetaminophen-caffeine -40 MG per tablet  Commonly known as: FIORICET, ESGIC  Take 1 tablet by mouth every 6 hours as needed for Headaches     rosuvastatin 40 MG tablet  Commonly known as: CRESTOR  Take 1 tablet by mouth nightly     valsartan 40 MG tablet  Commonly known as: DIOVAN  Take 0.5 tablets by mouth daily            CONTINUE taking these medications      aspirin 81 MG chewable tablet               Where to Get Your Medications        These medications were sent to [facility] [address] [place], [place] - [address] [place] [address] [phone] - F [phone]  [address] , [facility] [address]      Phone: [phone]   butalbital-acetaminophen-caffeine -40 MG per tablet  rosuvastatin 40 MG tablet  valsartan 40 MG tablet        Activity: activity as tolerated  Diet: cardiac diet  Wound Care: none needed    Follow-up with  Alecia Alcantara MD  [address] [facility] [address] [phone]  Schedule an appointment as soon as possible for a visit in 1 week(s)  Hospital Follow Up. Blood pressure managment       Follow-up tests/labs as above.     Signed:  Wendy Soriano DO  1/31/2024  2:49 PM  **I personally spent 35 min on discharge**